# Patient Record
Sex: MALE | Race: WHITE | NOT HISPANIC OR LATINO | Employment: FULL TIME | URBAN - METROPOLITAN AREA
[De-identification: names, ages, dates, MRNs, and addresses within clinical notes are randomized per-mention and may not be internally consistent; named-entity substitution may affect disease eponyms.]

---

## 2017-05-13 LAB
BILIRUB SERPL-MCNC: NEGATIVE MG/DL
BLOOD URINE, POC: NORMAL
CLARITY, POC UA: CLEAR
COLOR, POC UA: YELLOW
GLUCOSE UR QL STRIP: NEGATIVE
KETONES UR QL STRIP: NEGATIVE
LEUKOCYTE EST, POC UA: NEGATIVE
NITRITE, POC UA: NEGATIVE
PH, POC UA: 6
PROTEIN, POC: NEGATIVE
SPECIFIC GRAVITY, POC UA: 1.01
UROBILINOGEN, POC UA: NORMAL

## 2018-08-30 ENCOUNTER — HISTORICAL (OUTPATIENT)
Dept: ADMINISTRATIVE | Facility: HOSPITAL | Age: 43
End: 2018-08-30

## 2018-08-30 LAB
ABS NEUT (OLG): 4.7
ALBUMIN SERPL-MCNC: 4.7 GM/DL (ref 3.4–5)
ALBUMIN/GLOB SERPL: 1.96 {RATIO} (ref 1.5–2.5)
ALP SERPL-CCNC: 52 UNIT/L (ref 38–126)
ALT SERPL-CCNC: 33 UNIT/L (ref 7–52)
APPEARANCE, UA: CLEAR
AST SERPL-CCNC: 25 UNIT/L (ref 15–37)
BACTERIA #/AREA URNS AUTO: NORMAL /HPF
BILIRUB SERPL-MCNC: 0.9 MG/DL (ref 0.2–1)
BILIRUB UR QL STRIP: NEGATIVE MG/DL
BILIRUBIN DIRECT+TOT PNL SERPL-MCNC: 0.2 MG/DL (ref 0–0.5)
BILIRUBIN DIRECT+TOT PNL SERPL-MCNC: 0.7 MG/DL
BUN SERPL-MCNC: 12 MG/DL (ref 7–18)
CALCIUM SERPL-MCNC: 9.4 MG/DL (ref 8.5–10)
CHLORIDE SERPL-SCNC: 101 MMOL/L (ref 98–107)
CHOLEST SERPL-MCNC: 143 MG/DL (ref 0–200)
CHOLEST/HDLC SERPL: 3 {RATIO}
CO2 SERPL-SCNC: 31 MMOL/L (ref 21–32)
COLOR UR: YELLOW
CREAT SERPL-MCNC: 0.76 MG/DL (ref 0.6–1.3)
CREAT UR-MCNC: 200 MG/DL
ERYTHROCYTE [DISTWIDTH] IN BLOOD BY AUTOMATED COUNT: 12.5 % (ref 11.5–17)
EST. AVERAGE GLUCOSE BLD GHB EST-MCNC: 146 MG/DL
GLOBULIN SER-MCNC: 2.4 GM/DL (ref 1.2–3)
GLUCOSE (UA): NEGATIVE MG/DL
GLUCOSE SERPL-MCNC: 108 MG/DL (ref 74–106)
HBA1C MFR BLD: 6.7 % (ref 4.4–6.4)
HCT VFR BLD AUTO: 49 % (ref 42–52)
HDLC SERPL-MCNC: 48 MG/DL (ref 35–60)
HGB BLD-MCNC: 16.3 GM/DL (ref 14–18)
HGB UR QL STRIP: NEGATIVE UNIT/L
KETONES UR QL STRIP: NEGATIVE MG/DL
LDLC SERPL CALC-MCNC: 83 MG/DL (ref 0–129)
LEUKOCYTE ESTERASE UR QL STRIP: NEGATIVE UNIT/L
LYMPHOCYTES # BLD AUTO: 1.9 X10(3)/MCL (ref 0.6–3.4)
LYMPHOCYTES NFR BLD AUTO: 26.1 % (ref 13–40)
MCH RBC QN AUTO: 29.4 PG (ref 27–31.2)
MCHC RBC AUTO-ENTMCNC: 33 GM/DL (ref 32–36)
MCV RBC AUTO: 88 FL (ref 80–94)
MICROALBUMIN UR-MCNC: 10 MG/L
MICROALBUMIN/CREAT RATIO PNL UR: <30 MG/GM
MONOCYTES # BLD AUTO: 0.6 X10(3)/MCL (ref 0–1.8)
MONOCYTES NFR BLD AUTO: 8.1 % (ref 0.1–24)
NEUTROPHILS NFR BLD AUTO: 65.8 % (ref 47–80)
NITRITE UR QL STRIP.AUTO: NEGATIVE
PH UR STRIP: 7.5 [PH]
PLATELET # BLD AUTO: 307 X10(3)/MCL (ref 130–400)
PMV BLD AUTO: 9.6 FL
POTASSIUM SERPL-SCNC: 3.9 MMOL/L (ref 3.5–5.1)
PROT SERPL-MCNC: 7.1 GM/DL (ref 6.4–8.2)
PROT UR QL STRIP: NEGATIVE MG/DL
RBC # BLD AUTO: 5.55 X10(6)/MCL (ref 4.7–6.1)
RBC #/AREA URNS HPF: NORMAL /HPF
SODIUM SERPL-SCNC: 139 MMOL/L (ref 136–145)
SP GR UR STRIP: 1.01
SQUAMOUS EPITHELIAL, UA: NORMAL /LPF
TRIGL SERPL-MCNC: 68 MG/DL (ref 30–150)
UROBILINOGEN UR STRIP-ACNC: 1 MG/DL
VLDLC SERPL CALC-MCNC: 13.6 MG/DL
WBC # SPEC AUTO: 7.2 X10(3)/MCL (ref 4.5–11.5)
WBC #/AREA URNS AUTO: NORMAL /[HPF]

## 2019-09-30 ENCOUNTER — HISTORICAL (OUTPATIENT)
Dept: ADMINISTRATIVE | Facility: HOSPITAL | Age: 44
End: 2019-09-30

## 2019-09-30 LAB
ABS NEUT (OLG): 3.9 X10(3)/MCL (ref 2.1–9.2)
ALBUMIN SERPL-MCNC: 4.6 GM/DL (ref 3.4–5)
ALBUMIN/GLOB SERPL: 2.88 {RATIO} (ref 1.5–2.5)
ALP SERPL-CCNC: 62 UNIT/L (ref 38–126)
ALT SERPL-CCNC: 24 UNIT/L (ref 7–52)
APPEARANCE, UA: CLEAR
AST SERPL-CCNC: 27 UNIT/L (ref 15–37)
BACTERIA #/AREA URNS AUTO: NORMAL /HPF
BILIRUB SERPL-MCNC: 0.6 MG/DL (ref 0.2–1)
BILIRUB UR QL STRIP: NEGATIVE MG/DL
BILIRUBIN DIRECT+TOT PNL SERPL-MCNC: 0.1 MG/DL (ref 0–0.5)
BILIRUBIN DIRECT+TOT PNL SERPL-MCNC: 0.5 MG/DL
BUN SERPL-MCNC: 18 MG/DL (ref 7–18)
CALCIUM SERPL-MCNC: 9.5 MG/DL (ref 8.5–10)
CHLORIDE SERPL-SCNC: 100 MMOL/L (ref 98–107)
CHOLEST SERPL-MCNC: 165 MG/DL (ref 0–200)
CHOLEST/HDLC SERPL: 3.4 {RATIO}
CO2 SERPL-SCNC: 32 MMOL/L (ref 21–32)
COLOR UR: YELLOW
CREAT SERPL-MCNC: 0.93 MG/DL (ref 0.6–1.3)
CREAT UR-MCNC: 100 MG/DL
ERYTHROCYTE [DISTWIDTH] IN BLOOD BY AUTOMATED COUNT: 12.9 % (ref 11.5–17)
EST. AVERAGE GLUCOSE BLD GHB EST-MCNC: 143 MG/DL
GLOBULIN SER-MCNC: 1.6 GM/DL (ref 1.2–3)
GLUCOSE (UA): NEGATIVE MG/DL
GLUCOSE SERPL-MCNC: 200 MG/DL (ref 74–106)
HBA1C MFR BLD: 6.6 % (ref 4.4–6.4)
HCT VFR BLD AUTO: 45.5 % (ref 42–52)
HDLC SERPL-MCNC: 49 MG/DL (ref 35–60)
HGB BLD-MCNC: 15.7 GM/DL (ref 14–18)
HGB UR QL STRIP: NEGATIVE UNIT/L
KETONES UR QL STRIP: NEGATIVE MG/DL
LDLC SERPL CALC-MCNC: 115 MG/DL (ref 0–129)
LEUKOCYTE ESTERASE UR QL STRIP: NEGATIVE UNIT/L
LYMPHOCYTES # BLD AUTO: 1.7 X10(3)/MCL (ref 0.6–3.4)
LYMPHOCYTES NFR BLD AUTO: 27.5 % (ref 13–40)
MCH RBC QN AUTO: 29.6 PG (ref 27–31.2)
MCHC RBC AUTO-ENTMCNC: 34 GM/DL (ref 32–36)
MCV RBC AUTO: 86 FL (ref 80–94)
MICROALBUMIN UR-MCNC: 10 MG/L
MICROALBUMIN/CREAT RATIO PNL UR: <30 MG/GM
MONOCYTES # BLD AUTO: 0.4 X10(3)/MCL (ref 0.1–1.3)
MONOCYTES NFR BLD AUTO: 7.4 % (ref 0.1–24)
NEUTROPHILS NFR BLD AUTO: 65.1 % (ref 47–80)
NITRITE UR QL STRIP.AUTO: NEGATIVE
PH UR STRIP: 7 [PH]
PLATELET # BLD AUTO: 307 X10(3)/MCL (ref 130–400)
PMV BLD AUTO: 9.5 FL (ref 9.4–12.4)
POTASSIUM SERPL-SCNC: 4 MMOL/L (ref 3.5–5.1)
PROT SERPL-MCNC: 6.2 GM/DL (ref 6.4–8.2)
PROT UR QL STRIP: NEGATIVE MG/DL
RBC # BLD AUTO: 5.3 X10(6)/MCL (ref 4.7–6.1)
RBC #/AREA URNS HPF: NORMAL /HPF
SODIUM SERPL-SCNC: 140 MMOL/L (ref 136–145)
SP GR UR STRIP: 1.02
SQUAMOUS EPITHELIAL, UA: NORMAL /LPF
TRIGL SERPL-MCNC: 77 MG/DL (ref 30–150)
UROBILINOGEN UR STRIP-ACNC: 0.2 MG/DL
VLDLC SERPL CALC-MCNC: 15.4 MG/DL
WBC # SPEC AUTO: 6 X10(3)/MCL (ref 4.5–11.5)
WBC #/AREA URNS AUTO: NORMAL /[HPF]

## 2020-09-16 ENCOUNTER — HISTORICAL (OUTPATIENT)
Dept: ADMINISTRATIVE | Facility: HOSPITAL | Age: 45
End: 2020-09-16

## 2020-09-16 LAB
ABS NEUT (OLG): 5.4 X10(3)/MCL (ref 2.1–9.2)
ALBUMIN SERPL-MCNC: 4.6 GM/DL (ref 3.4–5)
ALBUMIN/GLOB SERPL: 2.19 {RATIO} (ref 1.5–2.5)
ALP SERPL-CCNC: 56 UNIT/L (ref 38–126)
ALT SERPL-CCNC: 27 UNIT/L (ref 7–52)
APPEARANCE, UA: CLEAR
AST SERPL-CCNC: 20 UNIT/L (ref 15–37)
BACTERIA #/AREA URNS AUTO: NORMAL /HPF
BILIRUB SERPL-MCNC: 0.7 MG/DL (ref 0.2–1)
BILIRUB UR QL STRIP: NEGATIVE MG/DL
BILIRUBIN DIRECT+TOT PNL SERPL-MCNC: 0.2 MG/DL (ref 0–0.5)
BILIRUBIN DIRECT+TOT PNL SERPL-MCNC: 0.5 MG/DL
BUN SERPL-MCNC: 16 MG/DL (ref 7–18)
CALCIUM SERPL-MCNC: 9.4 MG/DL (ref 8.5–10.1)
CHLORIDE SERPL-SCNC: 102 MMOL/L (ref 98–107)
CHOLEST SERPL-MCNC: 148 MG/DL (ref 0–200)
CHOLEST/HDLC SERPL: 3.1 {RATIO}
CO2 SERPL-SCNC: 31 MMOL/L (ref 21–32)
COLOR UR: YELLOW
CREAT SERPL-MCNC: 0.85 MG/DL (ref 0.6–1.3)
CREAT UR-MCNC: 50 MG/DL
ERYTHROCYTE [DISTWIDTH] IN BLOOD BY AUTOMATED COUNT: 12.5 % (ref 11.5–17)
EST. AVERAGE GLUCOSE BLD GHB EST-MCNC: 146 MG/DL
GLOBULIN SER-MCNC: 2.1 GM/DL (ref 1.2–3)
GLUCOSE (UA): NEGATIVE MG/DL
GLUCOSE SERPL-MCNC: 127 MG/DL (ref 74–106)
HBA1C MFR BLD: 6.7 % (ref 4.4–6.4)
HCT VFR BLD AUTO: 46.3 % (ref 42–52)
HDLC SERPL-MCNC: 47 MG/DL (ref 35–60)
HGB BLD-MCNC: 15.7 GM/DL (ref 14–18)
HGB UR QL STRIP: NEGATIVE UNIT/L
KETONES UR QL STRIP: NEGATIVE MG/DL
LDLC SERPL CALC-MCNC: 97 MG/DL (ref 0–129)
LEUKOCYTE ESTERASE UR QL STRIP: NEGATIVE UNIT/L
LYMPHOCYTES # BLD AUTO: 1.9 X10(3)/MCL (ref 0.6–3.4)
LYMPHOCYTES NFR BLD AUTO: 24.1 % (ref 13–40)
MCH RBC QN AUTO: 29.3 PG (ref 27–31.2)
MCHC RBC AUTO-ENTMCNC: 34 GM/DL (ref 32–36)
MCV RBC AUTO: 86 FL (ref 80–94)
MICROALBUMIN UR-MCNC: 10 MG/L
MICROALBUMIN/CREAT RATIO PNL UR: <30 MG/GM
MONOCYTES # BLD AUTO: 0.7 X10(3)/MCL (ref 0.1–1.3)
MONOCYTES NFR BLD AUTO: 8.7 % (ref 0.1–24)
NEUTROPHILS NFR BLD AUTO: 67.2 % (ref 47–80)
NITRITE UR QL STRIP.AUTO: NEGATIVE
PH UR STRIP: 8 [PH]
PLATELET # BLD AUTO: 303 X10(3)/MCL (ref 130–400)
PMV BLD AUTO: 9.8 FL (ref 9.4–12.4)
POTASSIUM SERPL-SCNC: 4.1 MMOL/L (ref 3.5–5.1)
PROT SERPL-MCNC: 6.7 GM/DL (ref 6.4–8.2)
PROT UR QL STRIP: NEGATIVE MG/DL
RBC # BLD AUTO: 5.35 X10(6)/MCL (ref 4.7–6.1)
RBC #/AREA URNS HPF: NORMAL /HPF
SODIUM SERPL-SCNC: 141 MMOL/L (ref 136–145)
SP GR UR STRIP: 1.02
SQUAMOUS EPITHELIAL, UA: NORMAL /LPF
TRIGL SERPL-MCNC: 90 MG/DL (ref 30–150)
UROBILINOGEN UR STRIP-ACNC: 1 MG/DL
VLDLC SERPL CALC-MCNC: 18 MG/DL
WBC # SPEC AUTO: 8 X10(3)/MCL (ref 4.5–11.5)
WBC #/AREA URNS AUTO: NORMAL /[HPF]

## 2021-09-01 ENCOUNTER — HISTORICAL (OUTPATIENT)
Dept: ADMINISTRATIVE | Facility: HOSPITAL | Age: 46
End: 2021-09-01

## 2021-09-01 LAB
ABS NEUT (OLG): 4.4 X10(3)/MCL (ref 2.1–9.2)
ALBUMIN SERPL-MCNC: 4.6 GM/DL (ref 3.4–5)
ALBUMIN/GLOB SERPL: 2 {RATIO} (ref 1.5–2.5)
ALP SERPL-CCNC: 72 UNIT/L (ref 38–126)
ALT SERPL-CCNC: 29 UNIT/L (ref 7–52)
APPEARANCE, UA: CLEAR
AST SERPL-CCNC: 20 UNIT/L (ref 15–37)
BACTERIA #/AREA URNS AUTO: NORMAL /HPF
BILIRUB SERPL-MCNC: 0.8 MG/DL (ref 0.2–1)
BILIRUB UR QL STRIP: NEGATIVE MG/DL
BILIRUBIN DIRECT+TOT PNL SERPL-MCNC: 0.2 MG/DL (ref 0–0.5)
BILIRUBIN DIRECT+TOT PNL SERPL-MCNC: 0.6 MG/DL
BUN SERPL-MCNC: 15 MG/DL (ref 7–18)
CALCIUM SERPL-MCNC: 9.7 MG/DL (ref 8.5–10.1)
CHLORIDE SERPL-SCNC: 100 MMOL/L (ref 98–107)
CHOLEST SERPL-MCNC: 154 MG/DL (ref 0–200)
CHOLEST/HDLC SERPL: 3.3 {RATIO}
CO2 SERPL-SCNC: 34 MMOL/L (ref 21–32)
COLOR UR: YELLOW
CREAT SERPL-MCNC: 0.91 MG/DL (ref 0.6–1.3)
CREAT UR-MCNC: 100 MG/DL
ERYTHROCYTE [DISTWIDTH] IN BLOOD BY AUTOMATED COUNT: 12.5 % (ref 11.5–17)
EST CREAT CLEARANCE SER (OHS): 111.63 ML/MIN
EST. AVERAGE GLUCOSE BLD GHB EST-MCNC: 180 MG/DL
GLOBULIN SER-MCNC: 2.3 GM/DL (ref 1.2–3)
GLUCOSE (UA): NEGATIVE MG/DL
GLUCOSE SERPL-MCNC: 223 MG/DL (ref 74–106)
HBA1C MFR BLD: 7.9 % (ref 4.4–6.4)
HCT VFR BLD AUTO: 46.2 % (ref 42–52)
HDLC SERPL-MCNC: 47 MG/DL (ref 35–60)
HGB BLD-MCNC: 15.4 GM/DL (ref 14–18)
HGB UR QL STRIP: NEGATIVE UNIT/L
KETONES UR QL STRIP: NEGATIVE MG/DL
LDLC SERPL CALC-MCNC: 90 MG/DL (ref 0–129)
LEUKOCYTE ESTERASE UR QL STRIP: NEGATIVE UNIT/L
LYMPHOCYTES # BLD AUTO: 1.5 X10(3)/MCL (ref 0.6–3.4)
LYMPHOCYTES NFR BLD AUTO: 23.3 % (ref 13–40)
MCH RBC QN AUTO: 29.1 PG (ref 27–31.2)
MCHC RBC AUTO-ENTMCNC: 33 GM/DL (ref 32–36)
MCV RBC AUTO: 87 FL (ref 80–94)
MICROALBUMIN UR-MCNC: 10 MG/L
MICROALBUMIN/CREAT RATIO PNL UR: <30 MG/GM
MONOCYTES # BLD AUTO: 0.5 X10(3)/MCL (ref 0.1–1.3)
MONOCYTES NFR BLD AUTO: 8 % (ref 0.1–24)
NEUTROPHILS NFR BLD AUTO: 68.7 % (ref 47–80)
NITRITE UR QL STRIP.AUTO: NEGATIVE
PH UR STRIP: 7 [PH]
PLATELET # BLD AUTO: 304 X10(3)/MCL (ref 130–400)
PMV BLD AUTO: 10.2 FL (ref 9.4–12.4)
POTASSIUM SERPL-SCNC: 3.8 MMOL/L (ref 3.5–5.1)
PROT SERPL-MCNC: 6.9 GM/DL (ref 6.4–8.2)
PROT UR QL STRIP: NEGATIVE MG/DL
RBC # BLD AUTO: 5.3 X10(6)/MCL (ref 4.7–6.1)
RBC #/AREA URNS HPF: NORMAL /HPF
SODIUM SERPL-SCNC: 141 MMOL/L (ref 136–145)
SP GR UR STRIP: 1.02
SQUAMOUS EPITHELIAL, UA: NORMAL /LPF
TRIGL SERPL-MCNC: 78 MG/DL (ref 30–150)
UROBILINOGEN UR STRIP-ACNC: 1 MG/DL
VLDLC SERPL CALC-MCNC: 15.6 MG/DL
WBC # SPEC AUTO: 6.4 X10(3)/MCL (ref 4.5–11.5)
WBC #/AREA URNS AUTO: NORMAL /[HPF]

## 2022-04-10 ENCOUNTER — HISTORICAL (OUTPATIENT)
Dept: ADMINISTRATIVE | Facility: HOSPITAL | Age: 47
End: 2022-04-10

## 2022-04-29 VITALS
HEIGHT: 66 IN | BODY MASS INDEX: 26.72 KG/M2 | OXYGEN SATURATION: 97 % | SYSTOLIC BLOOD PRESSURE: 118 MMHG | DIASTOLIC BLOOD PRESSURE: 80 MMHG | WEIGHT: 166.25 LBS

## 2022-05-02 NOTE — HISTORICAL [facility]ERNER
This is a historical note converted from Derrick. Formatting and pictures may have been removed.  Please reference Derrick for original formatting and attached multimedia. Chief Complaint  Annual wellness physical- ?NPO  History of Present Illness  Pt presents for Wellness exam.  He has been feeling well.  He is  at [employer].  He has been sleeping well.  Appetite is good.  He has been walking daily and riding his bike.  No tobacco.  He drinks a 6 pack on weekends.  + coffee: 2 cups in am. He may have 1 soft drink a day.   with a?8 year old son; he is in the 3rd grade.  He has been vaccinated for COVID.  [facility]  During COVID,?he did home renovations and added a pool.  Review of Systems  Constitutional:?no?weight gain,?no?weight loss,?no?fatigue, ?no?fever, ?no?chills, ?no?weakness, ?no?trouble sleeping  Eyes: ?no?vision loss/changes,?+?glasses,??no?pain,??no?redness,??no?blurry or double vision,??no?flashing lights,??no?glaucoma,??no?cataracts  Last eye exam:? Anatoly Ernandez [facility], no BDR, 7/30/2021, report in chart  Neck: ?no?lymphadenopathy,??no?thyroid abnormalities,??no?bruits,??no?stiffness  Ears:?no?decreased hearing,??no?tinnitus,??no?earache,??no?drainage?  Nose:?no?congestion,??no?rhinorrhea,??no?epistaxis,??no?sinus pressure  Throat/Oral:?no?sore throat,??no?hoarseness, ?no?dental caries,??no?gum bleeding,??no?oral lesions  Cardiovascular:?no?chest pain, palpitations, or tightness,?no?dyspnea with exertion,??no?orthopnea,??no?paroxysmal nocturnal dyspnea, + hypertension, + hypercholesteremia  Respiratory:?no?cough,?no?sputum,??no?hemoptysis,??no?dyspnea,??no?wheezing,??no?pleuritic chest pain?  Gastrointestinal:?no?abdominal pain,?no?nausea,?no?vomiting,??no?heartburn,??no?dysphagia or odynophagia,??no?diarrhea,??no?constipation,??no?melena,??no?hematochezia,?no?jaundice  Urinary:?no?frequency,??no?urgency,??no?burning or  pain,?no?hematuria,??no?incontinence,??no?hesitancy,??no?incomplete voiding,??no?flank pain,??no?nocturia  Musculoskeletal:?no?myalgias,?no?arthralgias,?no?neck pain,??no?back pain,??no?swelling of extremities  Skin:?no?rashes,?no?sores,?no?non-healing wounds  Neurologic:?no?headaches,?no?dizziness/lightheadedness,?no?tremors,?no?paresthesias,??no?seizures,??no?muscle weakness  Psychiatric:?no?depression/sadness,?no?anhedonia,?no?irritability,?no?suicidal ideations,?no?anxiety,?no?panic attacks  Endocrine:?no?hot or cold intolerance,?no?sweating,?no?polyuria,?no?polydipsia,?no?polyphagia, + diabetes mellitus II  Hematologic:?no?bruising,??no?bleeding disorders?  Physical Exam  Vitals & Measurements  T:?36.8? ?C (Oral)? HR:?85(Peripheral)? BP:?118/80? SpO2:?97%?  HT:?167.00?cm? WT:?75.400?kg? BMI:?27.04?  ?  GENERAL: The patient is a well-developed, well-nourished male in no?apparent distress. He is alert and oriented x 4.  HEENT: Head is normocephalic and atraumatic. Extraocular muscles are intact. Pupils are equal, round, and reactive to light and accommodation. Nares appeared normal. Mouth is well hydrated and without lesions. Mucous membranes are moist. Posterior pharynx clear of any exudate or lesions.  NECK: Supple. No carotid bruits.? No lymphadenopathy or thyromegaly.  LUNGS: Clear to auscultation.  HEART: Regular rate and rhythm without murmur, gallops or rubs.  ABDOMEN: Soft, nontender, and nondistended.? Positive bowel sounds.? No hepatosplenomegaly was noted.  EXTREMITIES: Without any cyanosis, clubbing, rash, lesions or edema. Bilateral feet:?Intact to 10 g monofilament x5; no ulcers, sores or calluses.  NEUROLOGIC: Cranial nerves II through XII are grossly intact.? No motor or sensory deficits.? Cerebellar function intact.  SKIN: No ulceration or induration present.  :? Normal male genitalia, no hernias,?testes descended with normal size and consistency.  ?  Assessment/Plan  1.?Wellness  examination?Z00.00  Patient presents for wellness examination.  He has been in good health.  He is without complaints or concerns.  He has been vaccinated for COVID.  Labs pending.  Ordered:  CBC w/ Auto Diff, Routine collect, 09/01/21 8:52:00 CDT, Blood, Order for future visit, Stop date 09/01/21 8:52:00 CDT, Lab Collect, Wellness examination  HTN (hypertension), 09/01/21 8:52:00 CDT  Clinic Follow-Up Wellness, *Est. 09/01/22 3:00:00 CDT, Order for future visit, Wellness examination, [facility]  Comprehensive Metabolic Panel, Routine collect, 09/01/21 8:52:00 CDT, Blood, Order for future visit, Stop date 09/01/21 8:52:00 CDT, Lab Collect, Wellness examination  HTN (hypertension)  Diabetes mellitus, type 2, 09/01/21 8:52:00 CDT  Lab Collection Request, 09/01/21 8:52:00 CDT, [facility] AMB - [facility], 09/01/21 8:52:00 CDT, Wellness examination  Diabetes mellitus, type 2  HTN (hypertension)  Hyperlipidemia  Lipid Panel, Routine collect, 09/01/21 8:52:00 CDT, Blood, Order for future visit, Stop date 09/01/21 8:52:00 CDT, Lab Collect, Wellness examination  Hyperlipidemia  Diabetes mellitus, type 2  HTN (hypertension), 09/01/21 8:52:00 CDT  Preventative Health Care Est 40-64 years [id number] PC, Wellness examination  Diabetes mellitus, type 2  HTN (hypertension)  Hyperlipidemia, [facility] AMB - [facility], 09/01/21 8:52:00 CDT  Urinalysis no Reflex, Routine collect, Urine, Order for future visit, 09/01/21 8:52:00 CDT, Stop date 09/01/21 8:52:00 CDT, Nurse collect, Wellness examination  Diabetes mellitus, type 2  HTN (hypertension)  ?  2.?Diabetes mellitus, type 2?E11.9  Patient has been compliant with diet and exercise.  A1c, microalbumin and lipid?profile pending.  He is up-to-date with his eye?exams; report in chart.  Foot exam performed today; no evidence of neuropathy.  Ordered:  Comprehensive Metabolic Panel, Routine collect, 09/01/21 8:52:00 CDT, Blood, Order for future visit, Stop date 09/01/21 8:52:00 CDT, Lab Collect,  Wellness examination  HTN (hypertension)  Diabetes mellitus, type 2, 09/01/21 8:52:00 CDT  Hemoglobin A1c, Routine collect, 09/01/21 8:52:00 CDT, Blood, Order for future visit, Stop date 09/01/21 8:52:00 CDT, Lab Collect, Diabetes mellitus, type 2, 09/01/21 8:52:00 CDT  Lab Collection Request, 09/01/21 8:52:00 CDT, [facility] AMB - AFP, 09/01/21 8:52:00 CDT, Wellness examination  Diabetes mellitus, type 2  HTN (hypertension)  Hyperlipidemia  Lipid Panel, Routine collect, 09/01/21 8:52:00 CDT, Blood, Order for future visit, Stop date 09/01/21 8:52:00 CDT, Lab Collect, Wellness examination  Hyperlipidemia  Diabetes mellitus, type 2  HTN (hypertension), 09/01/21 8:52:00 CDT  Microalbumin Urine, Routine collect, Urine, Order for future visit, 09/01/21 8:52:00 CDT, Stop date 09/01/21 8:52:00 CDT, Nurse collect, Diabetes mellitus, type 2  [facility] Health Care Est 40-64 years [id number] PC, Wellness examination  Diabetes mellitus, type 2  HTN (hypertension)  Hyperlipidemia, [facility] AMB - AFP, 09/01/21 8:52:00 CDT  Urinalysis no Reflex, Routine collect, Urine, Order for future visit, 09/01/21 8:52:00 CDT, Stop date 09/01/21 8:52:00 CDT, Nurse collect, Wellness examination  Diabetes mellitus, type 2  HTN (hypertension)  ?  3.?HTN (hypertension)?I10  ?Well-controlled; continue current medication. ?Refills sent.  Ordered:  CBC w/ Auto Diff, Routine collect, 09/01/21 8:52:00 CDT, Blood, Order for future visit, Stop date 09/01/21 8:52:00 CDT, Lab Collect, Wellness examination  HTN (hypertension), 09/01/21 8:52:00 CDT  Comprehensive Metabolic Panel, Routine collect, 09/01/21 8:52:00 CDT, Blood, Order for future visit, Stop date 09/01/21 8:52:00 CDT, Lab Collect, Wellness examination  HTN (hypertension)  Diabetes mellitus, type 2, 09/01/21 8:52:00 CDT  Lab Collection Request, 09/01/21 8:52:00 CDT, [facility] AMB - AFP, 09/01/21 8:52:00 CDT, Wellness examination  Diabetes mellitus, type 2  HTN (hypertension)   Hyperlipidemia  Lipid Panel, Routine collect, 09/01/21 8:52:00 CDT, Blood, Order for future visit, Stop date 09/01/21 8:52:00 CDT, Lab Collect, Wellness examination  Hyperlipidemia  Diabetes mellitus, type 2  HTN (hypertension), 09/01/21 8:52:00 CDT  [facility] Est 40-64 years [id number] PC, Wellness examination  Diabetes mellitus, type 2  HTN (hypertension)  Hyperlipidemia, [facility] AMB - AFP, 09/01/21 8:52:00 CDT  Urinalysis no Reflex, Routine collect, Urine, Order for future visit, 09/01/21 8:52:00 CDT, Stop date 09/01/21 8:52:00 CDT, Nurse collect, Wellness examination  Diabetes mellitus, type 2  HTN (hypertension)  ?  4.?Hyperlipidemia?E78.5  Lipid profile pending.  Continue low-cholesterol diet.  Patient has been compliant with medication; refills sent.  Ordered:  Lab Collection Request, 09/01/21 8:52:00 CDT, [facility] AMB - AFP, 09/01/21 8:52:00 CDT, Wellness examination  Diabetes mellitus, type 2  HTN (hypertension)  Hyperlipidemia  Lipid Panel, Routine collect, 09/01/21 8:52:00 CDT, Blood, Order for future visit, Stop date 09/01/21 8:52:00 CDT, Lab Collect, Wellness examination  Hyperlipidemia  Diabetes mellitus, type 2  HTN (hypertension), 09/01/21 8:52:00 CDT  [facility] Est 40-64 years [id number] PC, Wellness examination  Diabetes mellitus, type 2  HTN (hypertension)  Hyperlipidemia, [facility] AMB - AFP, 09/01/21 8:52:00 CDT  ?  Orders:  glipizide-metformin, See Instructions, TAKE 1 TABLET BY MOUTH TWICE A DAY, # 180 tab(s), 3 Refill(s), Pharmacy: [facility]/pharmacy #[id number], 167, cm, Height/Length Dosing, 09/01/21 8:41:00 CDT, 75.4, kg, Weight Dosing, 09/01/21 8:41:00 CDT  hydrochlorothiazide, See Instructions, TAKE 1 TABLET BY MOUTH EVERY DAY IN THE MORNING, # 90 tab(s), 3 Refill(s), Pharmacy: [facility]/pharmacy #[id number], 167, cm, Height/Length Dosing, 09/01/21 8:41:00 CDT, 75.4, kg, Weight Dosing, 09/01/21 8:41:00 CDT  losartan, See Instructions, TAKE 1 TABLET BY MOUTH DAILY, # 90 tab(s), 3  Refill(s), Pharmacy: [facility]/pharmacy #[id number], 167, cm, Height/Length Dosing, 09/01/21 8:41:00 CDT, 75.4, kg, Weight Dosing, 09/01/21 8:41:00 CDT  simvastatin, See Instructions, TAKE 1 TABLET BY MOUTH AT BEDTIME WITH EVENING MEAL, # 90 tab(s), 3 Refill(s), [id number] 167, cm, Height/Length Dosing, 09/01/21 8:41:00 CDT, 75.4, kg, Weight Dosing, 09/01/21 8:41:00 CDT  valACYclovir, See Instructions, TAKE 2 TABLET BY MOUTH ON ONSET AND REPEAT IN 12 HOURS, # 20 tab(s), 1 Refill(s), [id number], 167, cm, Height/Length Dosing, 09/01/21 8:41:00 CDT, 75.4, kg, Weight Dosing, 09/01/21 8:41:00 CDT  Referrals  Clinic Follow-Up Wellness, *Est. 09/01/22 3:00:00 CDT, Order for future visit, Wellness examination, [facility] [address] [facility]   Problem List/Past Medical History  Ongoing  Diabetes mellitus, type 2  HTN (hypertension)  Hyperlipidemia  Historical  No qualifying data  Procedure/Surgical History  Left 3rd digit fx repair (plate/screws) (03.2019)   Medications  glipizide-metformin 2.5 mg-500 mg oral tablet, See Instructions, 3 refills  hydrochlorothiazide 25 mg oral tablet, See Instructions, 3 refills  losartan 100 mg oral tablet, See Instructions, 3 refills  simvastatin 40 mg oral tablet, See Instructions, 3 refills  valACYclovir 1 g oral tablet, See Instructions, 1 refills  Allergies  No Known Allergies  Social History  Abuse/Neglect  No, 09/30/2019  Alcohol  Current, Beer, 1-2 times per week, 05/26/2018  Employment/School  Employed, Work/School description: SALES., 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 Living situation: Home/Independent., 09/30/2019  Nutrition/Health  Regular, Caffeine intake amount: 2 CUPS OF COFFEE & 1 SODA PER DAY., 08/30/2018  Substance Use  Never, 05/13/2017  Tobacco  Former smoker, quit more than 30 days ago, N/A, 27 Years (Age stopped)., 09/30/2019  Family History  Heart disease.: Mother.  Hyperlipidemia.:  Sister.  Primary malignant neoplasm of prostate: Father.  Immunizations  Vaccine Date Status   COVID-19 MRNA, LNP-S, PF- Pfizer 04/04/2021 Recorded   COVID-19 MRNA, LNP-S, PF- Pfizer 03/13/2021 Recorded   tetanus/diphtheria/pertussis, acel(Tdap) 09/16/2020 Given   pneumococcal 23-polyvalent vaccine 09/16/2020 Given   influenza virus vaccine, inactivated 09/30/2019 Given   influenza virus vaccine, inactivated 08/30/2018 Given   influenza virus vaccine, inactivated 10/13/2017 Recorded   influenza virus vaccine, inactivated 10/18/2016 Recorded   influenza virus vaccine, inactivated 11/11/2015 Recorded   influenza virus vaccine, inactivated 11/12/2014 Recorded   pneumococcal 23-polyvalent vaccine 06/17/2014 Recorded   tetanus/diphtheria/pertussis, acel(Tdap) 12/27/2011 Recorded   influenza virus vaccine, inactivated 10/06/2011 Recorded   Health Maintenance  Health Maintenance  ???Pending?(in the next year)  ??? ??OverDue  ??? ? ? ?Alcohol Misuse Screening due??01/02/21??and every 1??year(s)  ??? ??Due?  ??? ? ? ?ADL Screening due??09/01/21??and every 1??year(s)  ??? ? ? ?Depression Screening due??09/01/21??Unknown Frequency  ??? ? ? ?Diabetes Maintenance-Medication Prescribed due??09/01/21??and every 1??year(s)  ??? ??Due In Future?  ??? ? ? ?Diabetes Maintenance-Foot Exam not due until??09/16/21??and every 1??year(s)  ??? ? ? ?Diabetes Maintenance-HgbA1c not due until??09/16/21??and every 1??year(s)  ??? ? ? ?Hypertension Management-BMP not due until??09/16/21??and every 1??year(s)  ??? ? ? ?Diabetes Maintenance-Fasting Lipid Profile not due until??09/16/21??and every 1??year(s)  ??? ? ? ?Diabetes Maintenance-Serum Creatinine not due until??09/16/21??and every 1??year(s)  ??? ? ? ?Obesity Screening not due until??01/01/22??and every 1??year(s)  ??? ? ? ?Diabetes Maintenance-Eye Exam not due until??07/30/22??and every 1??year(s)  ???Satisfied?(in the past 1 year)  ??? ??Satisfied?  ??? ? ? ?Blood Pressure Screening  on??09/01/21.??Satisfied by Herlinda Kearns LPN  ??? ? ? ?Body Mass Index Check on??09/01/21.??Satisfied by Herlinda Kearns LPN  ??? ? ? ?Diabetes Maintenance-Eye Exam on??07/30/21.??Satisfied by Belinda Thurman  ??? ? ? ?Diabetes Maintenance-Fasting Lipid Profile on??09/16/20.??Satisfied by Jesu Jeffries  ??? ? ? ?Diabetes Maintenance-Foot Exam on??09/16/20.??Satisfied by Mariana Mackenzie NP  ??? ? ? ?Diabetes Maintenance-HgbA1c on??09/16/20.??Satisfied by Jesu Jeffries  ??? ? ? ?Diabetes Maintenance-Microalbumin on??09/16/20.??Satisfied by Ryann Whiting  ??? ? ? ?Diabetes Maintenance-Serum Creatinine on??09/16/20.??Satisfied by Jesu Jeffries  ??? ? ? ?Diabetes Screening on??09/16/20.??Satisfied by Jesu Jeffries  ??? ? ? ?Hypertension Management-Blood Pressure on??09/01/21.??Satisfied by Herlinda Kearns LPN  ??? ? ? ?Hypertension Management-BMP on??09/16/20.??Satisfied by Ryann Whiting  ??? ? ? ?Lipid Screening on??09/16/20.??Satisfied by Jesu Jeffries  ??? ? ? ?Obesity Screening on??09/01/21.??Satisfied by Herlinda Kearns LPN  ??? ? ? ?Tetanus Vaccine on??09/16/20.??Satisfied by Hamilton Crawford LPN  ?

## 2022-05-02 NOTE — HISTORICAL [facility]
This is a historical note converted from Derrick. Formatting and pictures may have been removed.  Please reference Derrick for original formatting and attached multimedia. Chief Complaint  ANNUAL WELLNESS PHYSICAL- NPO  History of Present Illness  Pt presents for Wellness exam.  He has been feeling well.  He is  at [employer].  He has been sleeping well.  Appetite is good; he has been more compliant with diet. He states he could be doing better.  He has been walking daily.  No tobacco.  He drinks a 6 pack on weekends.  + coffee: 2 cups in am.   with a?6 year old son who?was diagnosed with peanut and shellfish allergies last year.  He had surgery on his left third finger for?a fracture in March.  Review of Systems  Constitutional:?no?weight gain,?no?weight loss,?no?fatigue, ?no?fever, ?no?chills, ?no?weakness, ?no?trouble sleeping  Eyes: ?no?vision loss/changes,?+?glasses,??no?pain,??no?redness,??no?blurry or double vision,??no?flashing lights,??no?glaucoma,??no?cataracts  Last eye exam:?has been a year  Neck: ?no?lymphadenopathy,??no?thyroid abnormalities,??no?bruits,??no?stiffness  Ears:?no?decreased hearing,??no?tinnitus,??no?earache,??no?drainage?  Nose:?no?congestion,??no?rhinorrhea,??no?epistaxis,??no?sinus pressure  Throat/Oral:?no?sore throat,??no?hoarseness, ?no?dental caries,??no?gum bleeding,??no?oral lesions  Cardiovascular:?no?chest pain, palpitations, or tightness,?no?dyspnea with exertion,??no?orthopnea,??no?paroxysmal nocturnal dyspnea  Respiratory:?no?cough,?no?sputum,??no?hemoptysis,??no?dyspnea,??no?wheezing,??no?pleuritic chest pain?  Gastrointestinal:?no?abdominal pain,?no?nausea,?no?vomiting,??no?heartburn,??no?dysphagia or odynophagia,??no?diarrhea,??no?constipation,??no?melena,??no?hematochezia,?no?jaundice  Urinary:?no?frequency,??no?urgency,??no?burning or pain,?no?hematuria,??no?incontinence,??no?hesitancy,??no?incomplete voiding,??no?flank  pain,??no?nocturia  Musculoskeletal:?no?myalgias,?no?arthralgias,?no?neck pain,??no?back pain,??no?swelling of extremities  Skin:?no?rashes,?no?sores,?no?non-healing wounds  Neurologic:?no?headaches,?no?dizziness/lightheadedness,?no?tremors,?no?paresthesias,??no?seizures,??no?muscle weakness  Psychiatric:?no?depression/sadness,?no?anhedonia,?no?irritability,?no?suicidal ideations,?no?anxiety,?no?panic attacks  Endocrine:?no?hot or cold intolerance,?no?sweating,?no?polyuria,?no?polydipsia,?no?polyphagia  Hematologic:?no?bruising,??no?bleeding disorders?  Physical Exam  Vitals & Measurements  HR:?84(Peripheral)? BP:?120/80?  HT:?167?cm? WT:?73.9?kg? BMI:?26.5?  ?  GENERAL: The patient is a well-developed, well-nourished male in no?apparent distress. He is alert and oriented x 4.  HEENT: Head is normocephalic and atraumatic. Extraocular muscles are intact. Pupils are equal, round, and reactive to light and accommodation. Nares appeared normal. Mouth is well hydrated and without lesions. Mucous membranes are moist. Posterior pharynx clear of any exudate or lesions.  NECK: Supple. No carotid bruits.? No lymphadenopathy or thyromegaly.  LUNGS: Clear to auscultation.  HEART: Regular rate and rhythm without murmurs, rubs or gallops.  ABDOMEN: Soft, nontender, and nondistended.? Positive bowel sounds.? No hepatosplenomegaly was noted.  EXTREMITIES: Without any cyanosis, clubbing, rash, lesions or edema.? Bilateral feet: Intact to 10 g monofilament?x 5. ?No sores, ulcers or calluses.  NEUROLOGIC: Cranial nerves II through XII are grossly intact.? No motor or sensory deficits.? Cerebellar function intact.  SKIN: No ulceration or induration present.  :? Normal male genitalia, no hernias,?testes descended with normal size and consistency.  ?  Assessment/Plan  1.?Wellness examination?Z00.00  He has?been doing well. ?He did suffer?a left?third digit fracture?which she had repaired?by Dr. Micah Renee.  He stays physically  active and tries to be adherent to his diet.  Quadrivalent influenza vaccine administered today.  ?  Ordered:  CBC w/ Auto Diff, Routine collect, 09/30/19 8:21:00 CDT, Blood, Order for future visit, Stop date 09/30/19 8:21:00 CDT, Lab Collect, Wellness examination  HTN (hypertension), 09/30/19 8:21:00 CDT  Clinic Follow-Up Wellness, *Est. 09/30/20 3:00:00 CDT, Order for future visit, Wellness examination, [facility] AFP  Comprehensive Metabolic Panel, Routine collect, 09/30/19 8:21:00 CDT, Blood, Order for future visit, Stop date 09/30/19 8:21:00 CDT, Lab Collect, Wellness examination  Diabetes  HTN (hypertension)  Hyperlipidemia, 09/30/19 8:21:00 CDT  Lipid Panel, Routine collect, 09/30/19 8:21:00 CDT, Blood, Order for future visit, Stop date 09/30/19 8:21:00 CDT, Lab Collect, Wellness examination  Hyperlipidemia  Diabetes  HTN (hypertension), 09/30/19 8:21:00 CDT  Preventative Health Care Est 40-64 years [id number] PC, Wellness examination  Diabetes  HTN (hypertension)  Hyperlipidemia, [facility] AMB - AFP, 09/30/19 8:22:00 CDT  Urinalysis Complete no reflex, Routine collect, Urine, Order for future visit, 09/30/19 8:22:00 CDT, Stop date 09/30/19 8:22:00 CDT, Nurse collect, Wellness examination  Diabetes  HTN (hypertension)  ?  2.?Diabetes?E11.9  We will check A1c today.  Patient advised to have his eyes checked.  Feet exam done today.  Microalbumin pending.  Ordered:  Comprehensive Metabolic Panel, Routine collect, 09/30/19 8:21:00 CDT, Blood, Order for future visit, Stop date 09/30/19 8:21:00 CDT, Lab Collect, Wellness examination  Diabetes  HTN (hypertension)  Hyperlipidemia, 09/30/19 8:21:00 CDT  Hemoglobin A1c, Routine collect, 09/30/19 8:21:00 CDT, Blood, Order for future visit, Stop date 09/30/19 8:21:00 CDT, Lab Collect, Diabetes, 09/30/19 8:21:00 CDT  Lipid Panel, Routine collect, 09/30/19 8:21:00 CDT, Blood, Order for future visit, Stop date 09/30/19 8:21:00 CDT, Lab Collect, Wellness examination   Hyperlipidemia  Diabetes  HTN (hypertension), 09/30/19 8:21:00 CDT  Microalbumin Urine, Routine collect, Urine, Order for future visit, 09/30/19 8:21:00 CDT, Stop date 09/30/19 8:21:00 CDT, Nurse collect, Diabetes  Preventative [facility] Est 40-64 years [id number] PC, Wellness examination  Diabetes  HTN (hypertension)  Hyperlipidemia, [facility] AMB - AFP, 09/30/19 8:22:00 CDT  Urinalysis Complete no reflex, Routine collect, Urine, Order for future visit, 09/30/19 8:22:00 CDT, Stop date 09/30/19 8:22:00 CDT, Nurse collect, Wellness examination  Diabetes  HTN (hypertension)  ?  3.?HTN (hypertension)?I10  ?Well-controlled; continue current medications.  Ordered:  CBC w/ Auto Diff, Routine collect, 09/30/19 8:21:00 CDT, Blood, Order for future visit, Stop date 09/30/19 8:21:00 CDT, Lab Collect, Wellness examination  HTN (hypertension), 09/30/19 8:21:00 CDT  Comprehensive Metabolic Panel, Routine collect, 09/30/19 8:21:00 CDT, Blood, Order for future visit, Stop date 09/30/19 8:21:00 CDT, Lab Collect, Wellness examination  Diabetes  HTN (hypertension)  Hyperlipidemia, 09/30/19 8:21:00 CDT  Lipid Panel, Routine collect, 09/30/19 8:21:00 CDT, Blood, Order for future visit, Stop date 09/30/19 8:21:00 CDT, Lab Collect, Wellness examination  Hyperlipidemia  Diabetes  HTN (hypertension), 09/30/19 8:21:00 CDT  [facility] 40-64 years [id number] PC, Wellness examination  Diabetes  HTN (hypertension)  Hyperlipidemia, [facility] AMB - AFP, 09/30/19 8:22:00 CDT  Urinalysis Complete no reflex, Routine collect, Urine, Order for future visit, 09/30/19 8:22:00 CDT, Stop date 09/30/19 8:22:00 CDT, Nurse collect, Wellness examination  Diabetes  HTN (hypertension)  ?  4.?Hyperlipidemia?E78.5  ?Check today.  Ordered:  Comprehensive Metabolic Panel, Routine collect, 09/30/19 8:21:00 CDT, Blood, Order for future visit, Stop date 09/30/19 8:21:00 CDT, Lab Collect, Wellness examination  Diabetes  HTN (hypertension)   Hyperlipidemia, 09/30/19 8:21:00 CDT  Lipid Panel, Routine collect, 09/30/19 8:21:00 CDT, Blood, Order for future visit, Stop date 09/30/19 8:21:00 CDT, Lab Collect, Wellness examination  Hyperlipidemia  Diabetes  HTN (hypertension), 09/30/19 8:21:00 CDT  Preventative Health Care Est 40-64 years [id number] PC, Wellness examination  Diabetes  HTN (hypertension)  Hyperlipidemia, INK AMB - AFP, 09/30/19 8:22:00 CDT  ?  Orders:  glipizide-metformin, 1 tab(s), Oral, BID, # 180 tab(s), 3 Refill(s), Pharmacy: [facility]/pharmacy #[id number]  hydrochlorothiazide, 25 mg = 1 tab(s), Oral, qAM, # 90 tab(s), 3 Refill(s), Pharmacy: [facility]/pharmacy #[id number]  Influenza Virus Vaccine, Inactivated, 0.5 mL, IM, Once, first dose 09/30/19 8:20:00 CDT, stop date 09/30/19 8:20:00 CDT  losartan, See Instructions, TAKE 1 TABLET BY MOUTH DAILY, # 90 tab(s), 3 Refill(s), Pharmacy: [facility]/pharmacy #[id number]  simvastatin, See Instructions, TAKE 1 TABLET BY MOUTH AT BEDTIME WITH EVENING MEAL, # 90 tab(s), 3 Refill(s), Pharmacy: [facility]/pharmacy #[id number]  Lab Collection Request, 09/30/19 8:21:00 CDT, [facility] AMB - AFP, 09/30/19 8:21:00 CDT  Referrals  Clinic Follow-Up Wellness, *Est. 09/30/20 3:00:00 CDT, Order for future visit, Wellness examination, [facility]   Problem List/Past Medical History  Ongoing  Diabetes  HTN (hypertension)  Hyperlipidemia  Historical  No qualifying data  Procedure/Surgical History  none   Medications  glipizide-metformin 2.5 mg-500 mg oral tablet, 1 tab(s), Oral, BID, 3 refills  hydrochlorothiazide 25 mg oral tablet, 25 mg= 1 tab(s), Oral, qAM, 3 refills  losartan 100 mg oral tablet, See Instructions, 3 refills  simvastatin 40 mg oral tablet, See Instructions, 3 refills  valACYclovir 1 g oral tablet, See Instructions, 1 refills  Allergies  No Known Allergies  Social History  Abuse/Neglect  No, 09/30/2019  Alcohol  Current, Beer, 1-2 times per week, 05/26/2018  Employment/School  Employed, Work/School description: SALES.,  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 Living situation: Home/Independent., 09/30/2019  Nutrition/Health  Regular, Caffeine intake amount: 2 CUPS OF COFFEE & 1 SODA PER DAY., 08/30/2018  Substance Use  Never, 05/13/2017  Tobacco  Former smoker, quit more than 30 days ago, N/A, 27 Years (Age stopped)., 09/30/2019  Former smoker Use:. Cigarettes Type:. Stopped age 27 Years., 08/30/2018  Family History  Heart disease.: Mother.  Hyperlipidemia.: Sister.  Primary malignant neoplasm of prostate: Father.  Immunizations  Vaccine Date Status   influenza virus vaccine, inactivated 09/30/2019 Given   influenza virus vaccine, inactivated 08/30/2018 Given   influenza virus vaccine, inactivated 10/13/2017 Recorded   influenza virus vaccine, inactivated 10/18/2016 Recorded   influenza virus vaccine, inactivated 11/11/2015 Recorded   influenza virus vaccine, inactivated 11/12/2014 Recorded   pneumococcal 23-polyvalent vaccine 06/17/2014 Recorded   tetanus/diphtheria/pertussis, acel(Tdap) 12/27/2011 Recorded   influenza virus vaccine, inactivated 10/06/2011 Recorded   Health Maintenance  Health Maintenance  ???Pending?(in the next year)  ??? ??OverDue  ??? ? ? ?Influenza Vaccine due??and every?  ??? ? ? ?Alcohol Misuse Screening due??01/01/19??and every 1??year(s)  ??? ? ? ?Diabetes Maintenance-Fasting Lipid Profile due??08/30/19??and every 1??year(s)  ??? ? ? ?Diabetes Maintenance-HgbA1c due??08/30/19??and every 1??year(s)  ??? ? ? ?Hypertension Management-BMP due??08/30/19??and every 1??year(s)  ??? ??Due?  ??? ? ? ?ADL Screening due??09/30/19??and every 1??year(s)  ??? ? ? ?Depression Screening due??09/30/19??and every?  ??? ? ? ?Diabetes Maintenance-Eye Exam due??09/30/19??and every?  ??? ? ? ?Diabetes Maintenance-Foot Exam due??09/30/19??and every?  ??? ??Due In Future?  ??? ? ? ?Obesity Screening not due until??01/01/20??and every 1??year(s)  ??? ?  ? ?Blood Pressure Screening not due until??09/29/20??and every 1??year(s)  ??? ? ? ?Body Mass Index Check not due until??09/29/20??and every 1??year(s)  ??? ? ? ?Hypertension Management-Blood Pressure not due until??09/29/20??and every 1??year(s)  ???Satisfied?(in the past 1 year)  ??? ??Satisfied?  ??? ? ? ?Blood Pressure Screening on??09/30/19.??Satisfied by Herlinda Kearns LPN  ??? ? ? ?Body Mass Index Check on??09/30/19.??Satisfied by Herlinda Kearns LPN  ??? ? ? ?Hypertension Management-BMP on??09/30/19.??Satisfied by Jesu Jeffries  ??? ? ? ?Influenza Vaccine on??09/30/19.??Satisfied by Herlinda Kearns LPN  ??? ? ? ?Obesity Screening on??09/30/19.??Satisfied by Herlinda Kearns LPN  ?

## 2022-05-02 NOTE — HISTORICAL [facility]
This is a historical note converted from Derrick. Formatting and pictures may have been removed.  Please reference Derrick for original formatting and attached multimedia. Chief Complaint  ANNUAL WELLNESS PHYSICAL--- NPO  History of Present Illness  Pt presents for Wellness exam.  He has been feeling well.  He is starting a new position at [employer] on Monday; [employer].  He has been sleeping well.  Appetite is good; he has been more compliant with diet. He does report elevated blood sugars in am: 160s-180s.  He has been walking daily.  No tobacco.  He drinks a 6 pack over weekend.  + coffee: 2 cups in am.  His 5 year old son was diagnosed with peanut and shellfish allergies.  Review of Systems  Constitutional:?no?weight gain,?no?weight loss,?no?fatigue, ?no?fever, ?no?chills, ?no?weakness, ?no?trouble sleeping  Eyes: ?no?vision loss/changes,?+?glasses,??no?pain,??no?redness,??no?blurry or double vision,??no?flashing lights,??no?glaucoma,??no?cataracts  Last eye exam:?has an apt tomorrow  Neck: ?no?lymphadenopathy,??no?thyroid abnormalities,??no?bruits,??no?stiffness  Ears:?no?decreased hearing,??no?tinnitus,??no?earache,??no?drainage?  Nose:?no?congestion,??no?rhinorrhea,??no?epistaxis,??no?sinus pressure  Throat/Oral:?no?sore throat,??no?hoarseness, ?no?dental caries,??no?gum bleeding,??no?oral lesions  Cardiovascular:?no?chest pain, palpitations, or tightness,?no?dyspnea with exertion,??no?orthopnea,??no?paroxysmal nocturnal dyspnea  Respiratory:?no?cough,?no?sputum,??no?hemoptysis,??no?dyspnea,??no?wheezing,??no?pleuritic chest pain?  Gastrointestinal:?no?abd pain,?no?nausea,?no?vomiting,??no?heartburn,??no?dysphagia or odynophagia,??no?diarrhea,??no?constipation,??no?melena,??no?hematochezia,?no?jaundice  Urinary:?no?frequency,??no?urgency,??no?burning or pain,?no?hematuria,??no?incontinence,??no?hesitancy,??no?incomplete voiding,??no?flank pain,??no?nocturia, no erectile  dysfunction  Musculoskeletal:?no?myalgias,?no?arthralgias,?no?neck pain,??no?back pain,??no?swelling of extremities  Skin:?no?rashes,?no?sores,?no?non-healing wounds  Neurologic:?no?headaches,?no?dizziness/lightheadedness,?no?tremors,?no?paresthesias,??no?seizures,??no?muscle weakness  Psychiatric:?no?depression/sadness,?no?anhedonia,?no?irritability,?no?suicidal ideations,?no?anxiety,?no?panic attacks  Endocrine:?no?hot or cold intolerance,?no?sweating,?no?polyuria,?no?polydipsia,?no?polyphagia  Hematologic:?no?bruising,??no?bleeding disorders?  Physical Exam  Vitals & Measurements  HR:?92(Peripheral)? BP:?90/50?  HT:?167?cm? HT:?167?cm? WT:?75.4?kg? WT:?75.4?kg? BMI:?27.04?  ?  GENERAL: The patient is a well-developed, well-nourished male in no?apparent distress. He is alert and oriented x 4.  HEENT: Head is normocephalic and atraumatic. Extraocular muscles are intact. Pupils are equal, round, and reactive to light and accommodation. Nares appeared normal. Mouth is well hydrated and without lesions. Mucous membranes are moist. Posterior pharynx clear of any exudate or lesions.  NECK: Supple. No carotid bruits.? No lymphadenopathy or thyromegaly.  LUNGS: Clear to auscultation.  HEART: Regular rate and rhythm without murmurs, rubs or gallops.  ABDOMEN: Soft, nontender, and nondistended.? Positive bowel sounds.? No hepatosplenomegaly was noted.  EXTREMITIES: Without any cyanosis, clubbing, rash, lesions or edema.? Feet: intact to 10 gm monofilament x 5 bilaterally; no sores, lesions or callouses.  NEUROLOGIC: Cranial nerves II through XII are grossly intact.? No motor or sensory deficits.? Cerebellar function intact.  SKIN: No ulceration or induration present.  :? Nl male genitalia, no hernias,? testes descended, normal size/consistency.  ?  Assessment/Plan  1.?Wellness examination  ?Pt has been doing well. He has started walking recently.  Ordered:  Influenza Virus Vaccine, Inactivated, 0.5 mL, IM, Once, first  dose 08/30/18 11:24:00 CDT, stop date 08/30/18 11:24:00 CDT  Automated Diff, Routine collect, 08/30/18 11:22:00 CDT, Blood, Collected, Stop date 08/30/18 11:22:00 CDT, Lab Collect, Wellness examination, 08/30/18 11:22:00 CDT  CBC w/ Auto Diff, Routine collect, 08/30/18 11:22:00 CDT, Blood, Stop date 08/30/18 11:22:00 CDT, Lab Collect, Wellness examination, 08/30/18 11:22:00 CDT  Clinic Follow up, *Est. 02/28/19 3:00:00 CST, Order for future visit, Wellness examination, ink AFP  Comprehensive Metabolic Panel, Routine collect, 08/30/18 11:22:00 CDT, Blood, Stop date 08/30/18 11:22:00 CDT, Lab Collect, Wellness examination  Diabetes  HTN (hypertension)  Hyperlipidemia, 08/30/18 11:22:00 CDT  Lipid Panel, Routine collect, 08/30/18 11:22:00 CDT, Blood, Stop date 08/30/18 11:22:00 CDT, Lab Collect, Hyperlipidemia  Diabetes  HTN (hypertension)  Wellness examination, 08/30/18 11:22:00 CDT  Preventative Health Care Est 40-64 years [id number] PC, Wellness examination, [facility] AMB - AFP, 08/30/18 11:17:00 CDT  Urinalysis Complete no reflex, Routine collect, Urine, 08/30/18 11:22:00 CDT, Stop date 08/30/18 11:22:00 CDT, Nurse collect, Wellness examination  ?  2.?Diabetes  ?He reports elevated am readings. Check A1C today.  Ordered:  Comprehensive Metabolic Panel, Routine collect, 08/30/18 11:22:00 CDT, Blood, Stop date 08/30/18 11:22:00 CDT, Lab Collect, Wellness examination  Diabetes  HTN (hypertension)  Hyperlipidemia, 08/30/18 11:22:00 CDT  Hemoglobin A1c, Routine collect, 08/30/18 11:22:00 CDT, Blood, Stop date 08/30/18 11:22:00 CDT, Lab Collect, Diabetes, 08/30/18 11:22:00 CDT  Lipid Panel, Routine collect, 08/30/18 11:22:00 CDT, Blood, Stop date 08/30/18 11:22:00 CDT, Lab Collect, Hyperlipidemia  Diabetes  HTN (hypertension)  Wellness examination, 08/30/18 11:22:00 CDT  Microalbumin Urine, Routine collect, Urine, 08/30/18 11:22:00 CDT, Stop date 08/30/18 11:22:00 CDT, Nurse collect, Diabetes  ?  3.?HTN  (hypertension)  ?Controlled; his reading is actually low today. Pt is asymptomatic.  Ordered:  Comprehensive Metabolic Panel, Routine collect, 08/30/18 11:22:00 CDT, Blood, Stop date 08/30/18 11:22:00 CDT, Lab Collect, Wellness examination  Diabetes  HTN (hypertension)  Hyperlipidemia, 08/30/18 11:22:00 CDT  Lipid Panel, Routine collect, 08/30/18 11:22:00 CDT, Blood, Stop date 08/30/18 11:22:00 CDT, Lab Collect, Hyperlipidemia  Diabetes  HTN (hypertension)  Wellness examination, 08/30/18 11:22:00 CDT  ?  4.?Hyperlipidemia  ? Check today.  Ordered:  Comprehensive Metabolic Panel, Routine collect, 08/30/18 11:22:00 CDT, Blood, Stop date 08/30/18 11:22:00 CDT, Lab Collect, Wellness examination  Diabetes  HTN (hypertension)  Hyperlipidemia, 08/30/18 11:22:00 CDT  Lipid Panel, Routine collect, 08/30/18 11:22:00 CDT, Blood, Stop date 08/30/18 11:22:00 CDT, Lab Collect, Hyperlipidemia  Diabetes  HTN (hypertension)  Wellness examination, 08/30/18 11:22:00 CDT  ?  Orders:  glipizide-metformin, 1 tab(s), Oral, BID, # 180 tab(s), 3 Refill(s), Pharmacy: [facility]/pharmacy #[id number]  hydrochlorothiazide, 25 mg = 1 tab(s), Oral, qAM, # 90 tab(s), 3 Refill(s), Pharmacy: [facility]/pharmacy #[id number]  losartan, See Instructions, TAKE 1 TABLET BY MOUTH DAILY, # 90 tab(s), 3 Refill(s), Pharmacy: [facility]/pharmacy #[id number]  simvastatin, See Instructions, TAKE 1 TABLET BY MOUTH AT BEDTIME WITH EVENING MEAL, # 90 tab(s), 3 Refill(s), Pharmacy: [facility]/pharmacy #[id number]   Problem List/Past Medical History  Ongoing  Diabetes  HTN (hypertension)  Hyperlipidemia  Historical  No qualifying data  Procedure/Surgical History  none   Medications  glipizide-metformin 2.5 mg-500 mg oral tablet, 1 tab(s), Oral, BID, 3 refills  hydrochlorothiazide 25 mg oral tablet, 25 mg= 1 tab(s), Oral, qAM, 3 refills  Influenza Virus Vaccine, Inactivated, 0.5 mL, IM, Once  losartan 100 mg oral tablet, See Instructions, 3 refills  simvastatin 40 mg oral tablet, See Instructions, 3  refills  valACYclovir 1 g oral tablet, See Instructions, 1 refills  Allergies  No Known Allergies  Social History  Alcohol  Current, Beer, 1-2 times per week, 05/26/2018  Never, 05/13/2017  Employment/School  Employed, Work/School description: SALES., 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 Living situation: Home/Independent., 08/30/2018  Nutrition/Health  Regular, Caffeine intake amount: 2 CUPS OF COFFEE & 1 SODA PER DAY., 08/30/2018  Substance Abuse  Never, 05/13/2017  Tobacco  Former smoker Use:. Cigarettes Type:. Stopped age 27 Years., 08/30/2018  Family History  Heart disease.: Mother.  Hyperlipidemia.: Sister.  Primary malignant neoplasm of prostate: Father.  Immunizations  Vaccine Date Status   influenza virus vaccine, inactivated 10/13/2017 Recorded   influenza virus vaccine, inactivated 10/18/2016 Recorded   influenza virus vaccine, inactivated 11/11/2015 Recorded   influenza virus vaccine, inactivated 11/12/2014 Recorded   pneumococcal 23-polyvalent vaccine 06/17/2014 Recorded   tetanus/diphtheria/pertussis, acel(Tdap) 12/27/2011 Recorded   influenza virus vaccine, inactivated 10/06/2011 Recorded   Health Maintenance  Health Maintenance  ???Pending?(in the next year)  ??? ??OverDue  ??? ? ? ?Influenza Vaccine due??and every?  ??? ??Due?  ??? ? ? ?Alcohol Misuse Screening due??08/30/18??and every 1??year(s)  ??? ? ? ?Depression Screening due??08/30/18??and every?  ??? ? ? ?Diabetes Maintenance-Eye Exam due??08/30/18??and every?  ??? ? ? ?Diabetes Maintenance-Fasting Lipid Profile due??08/30/18??and every?  ??? ? ? ?Diabetes Maintenance-Foot Exam due??08/30/18??and every?  ??? ? ? ?Diabetes Maintenance-HgbA1c due??08/30/18??and every?  ??? ? ? ?Hypertension Management-BMP due??08/30/18??and every?  ???Satisfied?(in the past 1 year)  ??? ??Satisfied?  ??? ? ? ?Blood Pressure Screening on??08/30/18.??Satisfied by Herlinda Kearns  ??? ? ? ?Body  Mass Index Check on??08/30/18.??Satisfied by Herlinda Kearns  ??? ? ? ?Diabetes Maintenance-HgbA1c on??08/30/18.??Satisfied by Ming Robles MD  ??? ? ? ?Diabetes Screening on??08/30/18.??Satisfied by Ming Robles MD  ??? ? ? ?Hypertension Management-Blood Pressure on??08/30/18.??Satisfied by Herlinda Kearns  ??? ? ? ?Influenza Vaccine on??08/30/18.??Satisfied by Ming Robles MD  ??? ? ? ?Lipid Screening on??08/30/18.??Satisfied by Ming Robles MD  ??? ? ? ?Obesity Screening on??08/30/18.??Satisfied by Herlinda Kearns  ?  ?

## 2022-05-02 NOTE — HISTORICAL [facility]ERNER
This is a historical note converted from [place]. Formatting and pictures may have been removed.  Please reference [place] for original formatting and attached multimedia. Chief Complaint  Annual wellness physical- NPO  History of Present Illness  Pt presents for Wellness exam.  He has been feeling well.  He is  at [employer].  He has been sleeping well.  Appetite is good.  He has been walking daily and riding his bike.  He checks his CBGs few times yearly. He follows a low carb/ADA?diet.  No tobacco.  He drinks a 6 pack on weekends.  +coffee: 2 cups in am.   with a?7 year old son.  Review of Systems  GENERAL: No weight loss, no?weight gain, no fever, no fatigue, no chills, no night sweats  HEENT: No sore throat, no ear pain, no sinus pressure, no nasal congestion, no rhinorrhea, no decreased hearing  VISION: No vision changes, no blurry vision, no double vision, no glaucoma, no cataracts, +glasses, no contacts  LAST EYE EXAM: Feb 2020  NECK: no LAD  CARDIAC: No chest pain, no palpitations, no dyspnea on exertion, no orthopnea  RESPIRATORY: No cough, no wheezing, no sputum production, no?SOB  GI: No abdominal pain, no N/V, no constipation, no diarrhea, no blood in stool,?(-)?family history of Colon Ca  : No dysuria, no hematuria, no frequency, no urgency, no incontinence, no testicular pain/swelling, (+) family history of Prostate Ca--father  MUSC/SKEL: No myalgia, no weakness, no edema, no arthralgia, no joint swelling/effusions  SKIN: No rashes, no hives, no itching, no sores  NEURO: No HA, no numbness, no tingling, no weakness, no dizziness  PSYCH: No anxiety, no depression, no?irritability, no panic attacks,?no s/i, no h/i, no?hallucinations  ENDO: No polyuria, no polyphagia,? no polydipsia  HEME: No bruising, no bleeding disorders, no signs of anemia.?  Physical Exam  Vitals & Measurements  T:?36.5? ?C (Oral)? HR:?84(Peripheral)? BP:?100/70? SpO2:?98%?  HT:?167.00?cm? WT:?72.800?kg?  BMI:?26.1?  General: Well developed, well nourished in no apparent distress, alert and oriented x3  Skin:?No rash or abnormal?lesions  HEENT:?Normocephalic, PERRLA, EOMI, mouth WNL, throat WNL, nares normal, EAC and TM WNL bilaterally  Neck:?FROM, no LAD, no thyroid abnormalities?palpable  Chest:?CTA?bilaterally, no wheezes crackles or rubs  Cardiac: RRR,?no murmurs, rubs, gallops  Abdomen: Soft, nontender,?nondistended, NBSx4, no rebound tenderness or guarding, no HSM  Extremities:?No clubbing, cyanosis, or edema.? Joints WNL, +2 DP/PT pulses bilaterally  Neuro: No sensory or motor defects noted. CN II-XII intact. Gait WNL.  Genital: normal testes, no hernia  Feet: 10G filament x5 bilaterally, no ulcers or sores  Assessment/Plan  1.?Wellness examination?Z00.00  CBC, CMP, Lipids, UA, A1C, microalbumin?ordered today.?  Encourage pt to increase cardiovascular exercise and attempt to obtain at least 150 minutes of moderate aerobic exercise per week or 75 minutes of vigorous aerobic exercise weekly.??  Ordered:  CBC w/ Auto Diff, Routine collect, 09/16/20 9:46:00 CDT, Blood, Order for future visit, Stop date 09/16/20 9:46:00 CDT, Lab Collect, Wellness examination  Diabetes  HTN (hypertension)  Hyperlipidemia, 09/16/20 9:46:00 CDT  Clinic Follow-Up Wellness, *Est. 09/16/21 3:00:00 CDT, Order for future visit, Wellness examination  Diabetes  HTN (hypertension)  Hyperlipidemia  Immunization due, [facility]  Comprehensive Metabolic Panel, Routine collect, 09/16/20 9:46:00 CDT, Blood, Order for future visit, Stop date 09/16/20 9:46:00 CDT, Lab Collect, Wellness examination  Diabetes  HTN (hypertension)  Hyperlipidemia, 09/16/20 9:46:00 CDT  Electrocardiogram, Complete [id number] PC, 09/16/20 9:48:00 CDT, [facility] AMB - AFP, 09/16/20 9:48:00 CDT, Wellness examination  HTN (hypertension)  Hemoglobin A1c, Routine collect, 09/16/20 9:46:00 CDT, Blood, Order for future visit, Stop date 09/16/20 9:46:00 CDT, Lab Collect,  Wellness examination  Diabetes  HTN (hypertension)  Hyperlipidemia, 09/16/20 9:46:00 CDT  Lab Collection Request, 09/16/20 9:46:00 CDT, [facility] AMB - AFP, 09/16/20 9:46:00 CDT, Wellness examination  Diabetes  HTN (hypertension)  Hyperlipidemia  Lipid Panel, Routine collect, 09/16/20 9:46:00 CDT, Blood, Order for future visit, Stop date 09/16/20 9:46:00 CDT, Lab Collect, Wellness examination  Hyperlipidemia  Diabetes  HTN (hypertension), 09/16/20 9:46:00 CDT  Microalbumin Urine, Routine collect, Urine, Order for future visit, 09/16/20 9:46:00 CDT, Stop date 09/16/20 9:46:00 CDT, Nurse collect, Wellness examination  Diabetes  Preventative Health Care Est 40-64 years [id number] PC, Wellness examination  Diabetes  HTN (hypertension)  Hyperlipidemia  Immunization due, [facility] - AFP, 09/16/20 9:48:00 CDT  Urinalysis no Reflex, Routine collect, Urine, Order for future visit, 09/16/20 9:46:00 CDT, Stop date 09/16/20 9:46:00 CDT, Nurse collect, Wellness examination  Diabetes  HTN (hypertension)  ?  2.?Diabetes?E11.9  Stable/well controlled at this time. Continue current treatment, daily CBGs with log, low carb diet/ADA diet recommendations discussed.? Will continue to monitor and FU.?  Eye exam 2/2020.  Foot exam today.  Microalbumin/A1C today--on ARB.  Ordered:  CBC w/ Auto Diff, Routine collect, 09/16/20 9:46:00 CDT, Blood, Order for future visit, Stop date 09/16/20 9:46:00 CDT, Lab Collect, Wellness examination  Diabetes  HTN (hypertension)  Hyperlipidemia, 09/16/20 9:46:00 CDT  Clinic Follow-Up Wellness, *Est. 09/16/21 3:00:00 CDT, Order for future visit, Wellness examination  Diabetes  HTN (hypertension)  Hyperlipidemia  Immunization due, [facility]  Comprehensive Metabolic Panel, Routine collect, 09/16/20 9:46:00 CDT, Blood, Order for future visit, Stop date 09/16/20 9:46:00 CDT, Lab Collect, Wellness examination  Diabetes  HTN (hypertension)  Hyperlipidemia, 09/16/20 9:46:00 CDT  Hemoglobin A1c,  Routine collect, 09/16/20 9:46:00 CDT, Blood, Order for future visit, Stop date 09/16/20 9:46:00 CDT, Lab Collect, Wellness examination  Diabetes  HTN (hypertension)  Hyperlipidemia, 09/16/20 9:46:00 CDT  Lab Collection Request, 09/16/20 9:46:00 CDT, [facility] AMB - AFP, 09/16/20 9:46:00 CDT, Wellness examination  Diabetes  HTN (hypertension)  Hyperlipidemia  Lipid Panel, Routine collect, 09/16/20 9:46:00 CDT, Blood, Order for future visit, Stop date 09/16/20 9:46:00 CDT, Lab Collect, Wellness examination  Hyperlipidemia  Diabetes  HTN (hypertension), 09/16/20 9:46:00 CDT  Microalbumin Urine, Routine collect, Urine, Order for future visit, 09/16/20 9:46:00 CDT, Stop date 09/16/20 9:46:00 CDT, Nurse collect, Wellness examination  Diabetes  Preventative Health Care Est 40-64 years [id number] PC, Wellness examination  Diabetes  HTN (hypertension)  Hyperlipidemia  Immunization due, [facility] - AFP, 09/16/20 9:48:00 CDT  Urinalysis no Reflex, Routine collect, Urine, Order for future visit, 09/16/20 9:46:00 CDT, Stop date 09/16/20 9:46:00 CDT, Nurse collect, Wellness examination  Diabetes  HTN (hypertension)  ?  3.?HTN (hypertension)?I10  Stable and well controlled at this time. Continue current treatment. Limit salt in diet. Monitor BP at home.  EKG today--SR.  Ordered:  CBC w/ Auto Diff, Routine collect, 09/16/20 9:46:00 CDT, Blood, Order for future visit, Stop date 09/16/20 9:46:00 CDT, Lab Collect, Wellness examination  Diabetes  HTN (hypertension)  Hyperlipidemia, 09/16/20 9:46:00 CDT  Clinic Follow-Up Wellness, *Est. 09/16/21 3:00:00 CDT, Order for future visit, Wellness examination  Diabetes  HTN (hypertension)  Hyperlipidemia  Immunization due, [facility]  Comprehensive Metabolic Panel, Routine collect, 09/16/20 9:46:00 CDT, Blood, Order for future visit, Stop date 09/16/20 9:46:00 CDT, Lab Collect, Wellness examination  Diabetes  HTN (hypertension)  Hyperlipidemia, 09/16/20 9:46:00  CDT  Electrocardiogram, Complete [id number] PC, 09/16/20 9:48:00 CDT, [facility] AMB - AFP, 09/16/20 9:48:00 CDT, Wellness examination  HTN (hypertension)  Hemoglobin A1c, Routine collect, 09/16/20 9:46:00 CDT, Blood, Order for future visit, Stop date 09/16/20 9:46:00 CDT, Lab Collect, Wellness examination  Diabetes  HTN (hypertension)  Hyperlipidemia, 09/16/20 9:46:00 CDT  Lab Collection Request, 09/16/20 9:46:00 CDT, [facility] AMB - AFP, 09/16/20 9:46:00 CDT, Wellness examination  Diabetes  HTN (hypertension)  Hyperlipidemia  Lipid Panel, Routine collect, 09/16/20 9:46:00 CDT, Blood, Order for future visit, Stop date 09/16/20 9:46:00 CDT, Lab Collect, Wellness examination  Hyperlipidemia  Diabetes  HTN (hypertension), 09/16/20 9:46:00 CDT  Preventative Health Care Est 40-64 years [id number] PC, Wellness examination  Diabetes  HTN (hypertension)  Hyperlipidemia  Immunization due, [facility] - AFP, 09/16/20 9:48:00 CDT  Urinalysis no Reflex, Routine collect, Urine, Order for future visit, 09/16/20 9:46:00 CDT, Stop date 09/16/20 9:46:00 CDT, Nurse collect, Wellness examination  Diabetes  HTN (hypertension)  ?  4.?Hyperlipidemia?E78.5  Continue current medicine. ?Follow low-cholesterol diet with?physical activity.??  Ordered:  CBC w/ Auto Diff, Routine collect, 09/16/20 9:46:00 CDT, Blood, Order for future visit, Stop date 09/16/20 9:46:00 CDT, Lab Collect, Wellness examination  Diabetes  HTN (hypertension)  Hyperlipidemia, 09/16/20 9:46:00 CDT  Clinic Follow-Up Wellness, *Est. 09/16/21 3:00:00 CDT, Order for future visit, Wellness examination  Diabetes  HTN (hypertension)  Hyperlipidemia  Immunization due, [facility] AFP  Comprehensive Metabolic Panel, Routine collect, 09/16/20 9:46:00 CDT, Blood, Order for future visit, Stop date 09/16/20 9:46:00 CDT, Lab Collect, Wellness examination  Diabetes  HTN (hypertension)  Hyperlipidemia, 09/16/20 9:46:00 CDT  Hemoglobin A1c, Routine collect, 09/16/20 9:46:00 CDT, Blood,  Order for future visit, Stop date 09/16/20 9:46:00 CDT, Lab Collect, Wellness examination  Diabetes  HTN (hypertension)  Hyperlipidemia, 09/16/20 9:46:00 CDT  Lab Collection Request, 09/16/20 9:46:00 CDT, [facility] AMB - AFP, 09/16/20 9:46:00 CDT, Wellness examination  Diabetes  HTN (hypertension)  Hyperlipidemia  Lipid Panel, Routine collect, 09/16/20 9:46:00 CDT, Blood, Order for future visit, Stop date 09/16/20 9:46:00 CDT, Lab Collect, Wellness examination  Hyperlipidemia  Diabetes  HTN (hypertension), 09/16/20 9:46:00 CDT  Preventative Health Care Est 40-64 years [id number] PC, Wellness examination  Diabetes  HTN (hypertension)  Hyperlipidemia  Immunization due, [facility] - AFP, 09/16/20 9:48:00 CDT  ?  5.?Immunization due?Z23  Tdap 0.5ml admin today--risks/benefits discussed with patient.?  Ordered:  tetanus/diphth/pertuss (Tdap) adult/adol, 0.5 mL, IM, Once-Unscheduled, first dose 09/16/20 9:47:00 CDT  Clinic Follow-Up Wellness, *Est. 09/16/21 3:00:00 CDT, Order for future visit, Wellness examination  Diabetes  HTN (hypertension)  Hyperlipidemia  Immunization due, [facility]  Preventative Health Care Est 40-64 years [id number] PC, Wellness examination  Diabetes  HTN (hypertension)  Hyperlipidemia  Immunization due, [facility] AMB - AFP, 09/16/20 9:48:00 CDT  ?  Orders:  glipizide-metformin, See Instructions, TAKE 1 TABLET BY MOUTH TWICE A DAY, # 180 tab(s), 3 Refill(s), Pharmacy: [facility]/pharmacy #[id number], 167, cm, Height/Length Dosing, 09/16/20 9:24:00 CDT, 72.8, kg, Weight Dosing, 09/16/20 9:24:00 CDT  hydrochlorothiazide, See Instructions, TAKE 1 TABLET BY MOUTH EVERY DAY IN THE MORNING, # 90 tab(s), 3 Refill(s), Pharmacy: [facility]/pharmacy #[id number], 167, cm, Height/Length Dosing, 09/16/20 9:24:00 CDT, 72.8, kg, Weight Dosing, 09/16/20 9:24:00 CDT  losartan, See Instructions, TAKE 1 TABLET BY MOUTH DAILY, # 90 tab(s), 3 Refill(s), Pharmacy: [facility]/pharmacy #[id number], 167, cm, Height/Length Dosing, 09/16/20 9:24:00 CDT, 72.8,  kg, Weight Dosing, 09/16/20 9:24:00 CDT  simvastatin, See Instructions, TAKE 1 TABLET BY MOUTH AT BEDTIME WITH EVENING MEAL, # 90 tab(s), 3 Refill(s), Pharmacy: [facility]/pharmacy #[id number], 167, cm, Height/Length Dosing, 09/16/20 9:24:00 CDT, 72.8, kg, Weight Dosing, 09/16/20 9:24:00 CDT  Referrals  Clinic Follow-Up Wellness, *Est. 09/16/21 3:00:00 CDT, Order for future visit, Wellness examination  Diabetes  HTN (hypertension)  Hyperlipidemia  Immunization due, [facility]   Problem List/Past Medical History  Ongoing  Diabetes  HTN (hypertension)  Hyperlipidemia  Historical  No qualifying data  Procedure/Surgical History  Left 3rd digit fx repair (plate/screws) (03.2019)   Medications  glipizide-metformin 2.5 mg-500 mg oral tablet, See Instructions, 3 refills  hydrochlorothiazide 25 mg oral tablet, See Instructions, 3 refills  losartan 100 mg oral tablet, See Instructions, 3 refills  simvastatin 40 mg oral tablet, See Instructions, 3 refills  tetanus/diphth/pertuss (Tdap) adult/adol 5 units-2.5 units-18.5 mcg/0.5 mL intramuscular suspension, 0.5 mL, IM, Once-Unscheduled  valACYclovir 1 g oral tablet, See Instructions, 1 refills  Allergies  No Known Allergies  Social History  Abuse/Neglect  No, 09/30/2019  Alcohol  Current, Beer, 1-2 times per week, 05/26/2018  Employment/School  Employed, Work/School description: SALES., 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 Living situation: Home/Independent., 09/30/2019  Nutrition/Health  Regular, Caffeine intake amount: 2 CUPS OF COFFEE & 1 SODA PER DAY., 08/30/2018  Substance Use  Never, 05/13/2017  Tobacco  Former smoker, quit more than 30 days ago, N/A, 27 Years (Age stopped)., 09/30/2019  Family History  Heart disease.: Mother.  Hyperlipidemia.: Sister.  Primary malignant neoplasm of prostate: Father.  Immunizations  Vaccine Date Status   pneumococcal 23-polyvalent vaccine 09/16/2020 Given   influenza  virus vaccine, inactivated 09/30/2019 Given   influenza virus vaccine, inactivated 08/30/2018 Given   influenza virus vaccine, inactivated 10/13/2017 Recorded   influenza virus vaccine, inactivated 10/18/2016 Recorded   influenza virus vaccine, inactivated 11/11/2015 Recorded   influenza virus vaccine, inactivated 11/12/2014 Recorded   pneumococcal 23-polyvalent vaccine 06/17/2014 Recorded   tetanus/diphtheria/pertussis, acel(Tdap) 12/27/2011 Recorded   influenza virus vaccine, inactivated 10/06/2011 Recorded   Health Maintenance  Health Maintenance  ???Pending?(in the next year)  ??? ??OverDue  ??? ? ? ?Diabetes Maintenance-Microalbumin due??and every?  ??? ? ? ?Alcohol Misuse Screening due??01/02/20??and every 1??year(s)  ??? ??Due?  ??? ? ? ?ADL Screening due??09/16/20??and every 1??year(s)  ??? ? ? ?Depression Screening due??09/16/20??and every?  ??? ? ? ?Influenza Vaccine due??09/16/20??and every?  ??? ??Due In Future?  ??? ? ? ?Diabetes Maintenance-HgbA1c not due until??09/29/20??and every 1??year(s)  ??? ? ? ?Hypertension Management-BMP not due until??09/29/20??and every 1??year(s)  ??? ? ? ?Obesity Screening not due until??01/01/21??and every 1??year(s)  ??? ? ? ?Diabetes Maintenance-Eye Exam not due until??02/06/21??and every 1??year(s)  ???Satisfied?(in the past 1 year)  ??? ??Satisfied?  ??? ? ? ?Blood Pressure Screening on??09/16/20.??Satisfied by Herlinda Kearns LPN  ??? ? ? ?Body Mass Index Check on??09/16/20.??Satisfied by Herlinda Kearns LPN  ??? ? ? ?Diabetes Maintenance-Eye Exam on??02/07/20.??Satisfied by Eve Saucedo  ??? ? ? ?Diabetes Maintenance-Fasting Lipid Profile on??09/30/19.??Satisfied by Jesu Jeffries  ??? ? ? ?Diabetes Maintenance-Foot Exam on??09/16/20.??Satisfied by Gurdeep PRUETT, Mariana Villegas  ??? ? ? ?Diabetes Maintenance-HgbA1c on??09/30/19.??Satisfied by Jesu Jeffries  ??? ? ? ?Diabetes Screening on??09/30/19.??Satisfied by Jesu Jeffries  ??? ? ? ?Hypertension Management-Blood  Pressure on??09/16/20.??Satisfied by Herlinda Kearns LPN  ??? ? ? ?Hypertension Management-BMP on??09/30/19.??Satisfied by Jesu Jeffries  ??? ? ? ?Influenza Vaccine on??09/30/19.??Satisfied by Herlinda Kearns LPN  ??? ? ? ?Lipid Screening on??09/30/19.??Satisfied by Jesu Jeffries  ??? ? ? ?Obesity Screening on??09/16/20.??Satisfied by Herlinda Kearns LPN  ?      Patients condition discussed the development nurse practitioner.?  I have reviewed and agree with plan of care and follow-up.

## 2022-09-14 PROBLEM — E78.00 HYPERCHOLESTEREMIA: Status: ACTIVE | Noted: 2022-09-14

## 2022-09-14 PROBLEM — Z00.00 ENCOUNTER FOR WELLNESS EXAMINATION IN ADULT: Status: ACTIVE | Noted: 2022-09-14

## 2022-09-15 ENCOUNTER — HISTORICAL (OUTPATIENT)
Dept: ADMINISTRATIVE | Facility: HOSPITAL | Age: 47
End: 2022-09-15

## 2022-12-19 PROBLEM — Z00.00 ENCOUNTER FOR WELLNESS EXAMINATION IN ADULT: Status: RESOLVED | Noted: 2022-09-14 | Resolved: 2022-12-19

## 2023-06-20 PROBLEM — H65.02 NON-RECURRENT ACUTE SEROUS OTITIS MEDIA OF LEFT EAR: Status: ACTIVE | Noted: 2023-06-20

## 2023-09-15 PROBLEM — Z12.5 SCREENING PSA (PROSTATE SPECIFIC ANTIGEN): Status: ACTIVE | Noted: 2023-09-15

## 2023-09-15 PROBLEM — Z12.11 COLON CANCER SCREENING: Status: ACTIVE | Noted: 2023-09-15

## 2023-09-15 PROBLEM — Z23 IMMUNIZATION DUE: Status: ACTIVE | Noted: 2023-09-15

## 2023-12-18 PROBLEM — Z00.00 ENCOUNTER FOR WELLNESS EXAMINATION IN ADULT: Status: RESOLVED | Noted: 2022-09-14 | Resolved: 2023-12-18

## 2024-07-13 ENCOUNTER — OFFICE VISIT (OUTPATIENT)
Dept: URGENT CARE | Facility: CLINIC | Age: 49
End: 2024-07-13
Payer: COMMERCIAL

## 2024-07-13 VITALS
HEART RATE: 89 BPM | BODY MASS INDEX: 23.3 KG/M2 | DIASTOLIC BLOOD PRESSURE: 85 MMHG | SYSTOLIC BLOOD PRESSURE: 124 MMHG | RESPIRATION RATE: 18 BRPM | HEIGHT: 66 IN | WEIGHT: 145 LBS | TEMPERATURE: 98 F | OXYGEN SATURATION: 97 %

## 2024-07-13 DIAGNOSIS — H57.89 EYE IRRITATION: Primary | ICD-10-CM

## 2024-07-13 PROCEDURE — 99213 OFFICE O/P EST LOW 20 MIN: CPT | Mod: ,,, | Performed by: NURSE PRACTITIONER

## 2024-07-13 RX ORDER — OFLOXACIN 3 MG/ML
2 SOLUTION/ DROPS OPHTHALMIC 4 TIMES DAILY
Qty: 10 ML | Refills: 0 | Status: SHIPPED | OUTPATIENT
Start: 2024-07-13 | End: 2024-07-18

## 2024-07-13 NOTE — PROGRESS NOTES
"Subjective:      Patient ID: Junito Bryant is a 48 y.o. male.    Vitals:  height is 5' 6" (1.676 m) and weight is 65.8 kg (145 lb). His oral temperature is 98.1 °F (36.7 °C). His blood pressure is 124/85 and his pulse is 89. His respiration is 18 and oxygen saturation is 97%.     Chief Complaint: Eye Problem     Patient is a 48 y.o. male who presents to urgent care with complaints of possible foreign body in left eye x2 days. Alleviating factors include Visine with mild amount of relief. Patient denies trauma.      ROS   Objective:     Physical Exam   Constitutional: He is oriented to person, place, and time. He appears well-developed. He is cooperative.  Non-toxic appearance. He does not appear ill. No distress.   HENT:   Head: Normocephalic and atraumatic.   Ears:   Right Ear: Hearing, tympanic membrane, external ear and ear canal normal.   Left Ear: Hearing, tympanic membrane, external ear and ear canal normal.   Nose: Nose normal. No mucosal edema, rhinorrhea or nasal deformity. No epistaxis. Right sinus exhibits no maxillary sinus tenderness and no frontal sinus tenderness. Left sinus exhibits no maxillary sinus tenderness and no frontal sinus tenderness.   Mouth/Throat: Uvula is midline, oropharynx is clear and moist and mucous membranes are normal. No trismus in the jaw. Normal dentition. No uvula swelling. No oropharyngeal exudate, posterior oropharyngeal edema or posterior oropharyngeal erythema.   Eyes: Conjunctivae and lids are normal. Pupils are equal, round, and reactive to light. No scleral icterus.   Slit lamp exam:       The left eye shows no corneal abrasion, no corneal flare, no corneal ulcer, no foreign body, no hyphema, no hypopyon, no fluorescein uptake and no photophobia.      Comments: I examined with fluorescein and tetracaine  No foreign bodies noted no ulcerations noted no corneal abrasions noted   Neck: Trachea normal and phonation normal. Neck supple. No edema present. No erythema " present. No neck rigidity present.   Cardiovascular: Normal rate, regular rhythm, normal heart sounds and normal pulses.   Pulmonary/Chest: Effort normal and breath sounds normal. No respiratory distress. He has no decreased breath sounds. He has no rhonchi.   Abdominal: Normal appearance.   Musculoskeletal: Normal range of motion.         General: No deformity. Normal range of motion.   Neurological: He is alert and oriented to person, place, and time. He exhibits normal muscle tone. Coordination normal.   Skin: Skin is warm, dry, intact, not diaphoretic and not pale.   Psychiatric: His speech is normal and behavior is normal. Judgment and thought content normal.   Nursing note and vitals reviewed.      Assessment:     1. Eye irritation        Plan:   No definitive abnormalities noted upon exam but we will treat as a corneal abrasion    A corneal abrasion is a scratch or cut to the clear, outer layer of the eyeball.   -Small corneal abrasions are self-limited and typically heal within 24-48 hours.   -Avoid use of contact lenses until your symptoms have resolved.   -Avoid rubbing the affected eye to prevent further damage.      -Anesthetic drops were put in the affected eye to numb the surface of the eyeball to help with the examination and reduce pain. The effects of the numbing should wear off in 10-20 minutes. Do not touch or rub the eye until this has worn off to prevent from further injury.      -Drops of dye were put in the affected eye to help better visualize any damage to the cornea. Your eyeball and tears may appear yellow for a short time after.     -If damage to your eye was detected, you may be prescribed antibiotic drops or ointment as directed by your health care provider.     Eye irritation    Other orders  -     ofloxacin (OCUFLOX) 0.3 % ophthalmic solution; Place 2 drops into the left eye 4 (four) times daily. for 5 days  Dispense: 10 mL; Refill: 0

## 2024-09-03 DIAGNOSIS — Z00.00 WELLNESS EXAMINATION: Primary | ICD-10-CM

## 2024-09-16 NOTE — PROGRESS NOTES
..Annual Exam       HPI:     Patient presents for wellness examination.    He has been feeling well.    He has been sleeping well.    His appetite is good; doing well with diet.   He is  with a 10-year-old son.  He is in  at [employer]  He does not smoke.    He drinks a 6 pack on weekends.    He has 2 cups of coffee a day.  He may have 1 soft drink a day.  He exercises twice a week.  He has not been screened for colon cancer.    The patient's Health Maintenance was reviewed and the following appears to be due at this time:   Health Maintenance Due   Topic Date Due    Hepatitis C Screening  Never done    HIV Screening  Never done    Eye Exam  09/15/2023    Hemoglobin A1c  03/15/2024    COVID-19 Vaccine (4 - 2023-24 season) 09/01/2024    Diabetes Urine Screening  09/15/2024    Lipid Panel  09/15/2024       ..History reviewed. No pertinent past medical history.       ..  Past Surgical History:   Procedure Laterality Date    Left 3rd digit fx repair (plate/screws  03/2019          Current Outpatient Medications   Medication Instructions    glipizide-metformin (METAGLIP) 2.5-500 mg per tablet 1 tablet, Oral, 2 times daily with meals    hydroCHLOROthiazide (HYDRODIURIL) 25 mg, Oral, Daily    losartan (COZAAR) 100 mg, Oral, Daily    RYBELSUS 7 mg tablet Take 1 tablet by mouth daily on an empty stomach with no more than 4 oz of plain water. Swallow whole. Do not split, crush, or chew. Wait at least 30 minutes to eat, drink, or take other oral medications.    simvastatin (ZOCOR) 40 MG tablet TAKE 1 TABLET BY MOUTH  WITH EVENING MEAL Strength: 40 mg    valACYclovir (VALTREX) 1000 MG tablet Take 2 tablets by mouth on onset an repeat in 12 hours.         ..  Social History     Socioeconomic History    Marital status:     Number of children: 1   Occupational History    Occupation: sales   Tobacco Use    Smoking status: Former     Current packs/day: 0.00     Types: Cigarettes     Quit date: 2008      Years since quittin.7    Smokeless tobacco: Never   Substance and Sexual Activity    Alcohol use: Yes     Comment: 1-2 times a week    Drug use: Never     Social Determinants of Health     Financial Resource Strain: Low Risk  (2024)    Overall Financial Resource Strain (CARDIA)     Difficulty of Paying Living Expenses: Not hard at all   Food Insecurity: No Food Insecurity (2024)    Hunger Vital Sign     Worried About Running Out of Food in the Last Year: Never true     Ran Out of Food in the Last Year: Never true   Physical Activity: Sufficiently Active (2024)    Exercise Vital Sign     Days of Exercise per Week: 5 days     Minutes of Exercise per Session: 90 min   Stress: No Stress Concern Present (2024)    [employer] - Occupational Stress Questionnaire     Feeling of Stress : Only a little   Housing Stability: Unknown (2024)    Housing Stability Vital Sign     Unable to Pay for Housing in the Last Year: No          ..  Family History   Problem Relation Name Age of Onset    Heart disease Mother      Prostate cancer Father      Hyperlipidemia Sister            ..Review of patient's allergies indicates:  No Known Allergies       ..  Immunization History   Administered Date(s) Administered    COVID-19, MRNA, LN-S, PF (Pfizer) (Purple Cap) 2021, 2021, 2021    Influenza - Quadrivalent 2022    Influenza - Trivalent - Fluarix, Flulaval, Fluzone, Afluria - PF 10/06/2011, 2014, 2015, 10/18/2016, 10/13/2017, 2018, 2019, 10/28/2020, 2021, 2024    Pneumococcal Conjugate - 20 Valent 09/15/2023    Pneumococcal Polysaccharide - 23 Valent 2014, 2020    Tdap 2011, 2020          REVIEW OF SYSTEMS:    GENERAL: No weight loss, no weight gain, no fever, no fatigue, no chills, no night sweats  HEENT: No sore throat, no ear pain, no sinus pressure, no nasal congestion, no rhinorrhea, no decreased  "hearing, no snoring  VISION: No vision changes, no blurry vision, no double vision, no glaucoma, no cataracts, + glasses  LAST EYE EXAM:  [facility], appointment pending  NECK: no LAD  CARDIAC: No chest pain, no palpitations, no dyspnea on exertion, no orthopnea  RESPIRATORY: No cough, no wheezing, no sputum production, no SOB  GI: No abdominal pain, no N/V, no heartburn, no constipation, no diarrhea, no blood in stool, (- ) family history of Colon Ca  : No dysuria, no hematuria, no frequency, no urgency, no incontinence, no testicular pain/swelling, (+ ) family history of Prostate Ca: father  MUSC/SKEL: No myalgia, no weakness, no edema, no arthralgia, no joint swelling/effusions  SKIN: No rashes, no hives, no itching, no sores  NEURO: No HA, no numbness, no tingling, no weakness, no dizziness  PSYCH: No anxiety, no depression, no irritability, no panic attacks, no s/i, no h/i, no hallucinations  ENDO: No polyuria, no polyphagia, no polydipsia  HEME: No bruising, no bleeding disorders, no signs of anemia.       PHYSICAL EXAM:    ..Visit Vitals  /72 (BP Location: Right arm, Patient Position: Sitting, BP Method: Large (Manual))   Pulse 83   Temp 98.8 °F (37.1 °C)   Resp 18   Ht 5' 6" (1.676 m)   Wt 67.1 kg (148 lb)   SpO2 97%   BMI 23.89 kg/m²        General: Well developed, well nourished white male in no apparent distress, alert and oriented x3  Skin: No rash or abnormal lesions  HEENT: Normocephalic, PERRLA, EOMI, mouth WNL, throat WNL, nares normal, EAC and TM WNL bilaterally  Neck: FROM, no LAD, no thyroid abnormalities palpable  Chest: CTA bilaterally, no wheezes crackles or rubs  Cardiac: RRR, no murmurs, rubs, gallops  Abdomen: Soft, nontender, nondistended, NBSx4, no rebound tenderness or guarding, no HSM  Extremities: No clubbing, cyanosis, or edema. Joints WNL, +2 DP/PT pulses bilaterally  Neuro: No sensory or motor defects noted. CN II-XII intact. Gait WNL.  Genital: normal testes, no " hernias  Rectal:  Deferred  Protective Sensation (w/ 10 gram monofilament):  Right: Intact  Left: Intact    Visual Inspection:  Normal -  Bilateral    Pedal Pulses:   Right: Present  Left: Present    Posterior Tibialis Pulses:   Right:Present  Left: Present       1. Encounter for wellness examination in adult  Overview:  Patient presents for wellness examination.    He has been feeling well.    Patient encouraged to increase physical activity and exercise.  Colon cancer screening options discussed with patient; patient would like to do a Cologuard.    Prevnar 20 administered.    Last eye exam:  09/2022; he will be doing an eye exam soon.  Labs pending.    09/20/2024:   Patient presents for wellness examination.    He has been feeling well.    Continue ADA diet; limit intake of sugary foods refined carbohydrates.    Patient encouraged to increase physical activity and to exercise regularly.    Patient advised to have eyes examined.  Foot exam performed today.    His blood pressure is well controlled.    Influenza vaccine administered today.    Labs pending.      2. Type 2 diabetes mellitus without complication, without long-term current use of insulin  Overview:  A1C 9.0%.  On glipizide/metformin 2.5/500 mg twice daily. Daily CBGs with log, low carb diet/ADA diet recommendations discussed. Will continue to monitor and FU.   Eye exam 9/2022.  Foot exam 9/2022.  Microalbumin 9/2022; on ARB.     09/15/2023:  Compliance with ADA diet encouraged; limit intake of sugary foods refined carbohydrates.  Increase physical activity and exercise.    Foot exam performed today.    Last eye exam 09/2022; has one coming up.  A1c, microalbumin and lipid profile pending.    09/20/2024: Compliance with ADA diet encouraged; limit intake of sugary foods refined carbohydrates.  Increase physical activity and exercise.    Patient will have appointment soon at [facility].    Foot exam performed today.    A1c and microalbumin level  pending.      Orders:  -     glipizide-metformin (METAGLIP) 2.5-500 mg per tablet; Take 1 tablet by mouth 2 (two) times daily with meals.  Dispense: 180 tablet; Refill: 3    3. Hypercholesteremia  Overview:  Continue low-cholesterol/low-fat diet.    Patient has been compliant with medications; refills sent.    Lipid profile pending.    09/20/2024:  Continue low-cholesterol/low-fat diet.    Patient has been compliant with medications; refills sent.    Lipid profile pending.    Orders:  -     simvastatin (ZOCOR) 40 MG tablet; TAKE 1 TABLET BY MOUTH  WITH EVENING MEAL Strength: 40 mg  Dispense: 90 tablet; Refill: 3    4. Primary hypertension  Overview:  Controlled; continue current medications.  Refills sent.    09/20/2024:  His blood pressure is very well controlled; continue losartan hydrochlorothiazide; refills sent.      Orders:  -     hydroCHLOROthiazide (HYDRODIURIL) 25 MG tablet; Take 1 tablet (25 mg total) by mouth once daily.  Dispense: 90 tablet; Refill: 3  -     losartan (COZAAR) 100 MG tablet; Take 1 tablet (100 mg total) by mouth once daily.  Dispense: 90 tablet; Refill: 3    5. Screening PSA (prostate specific antigen)  Overview:  Patient denies any obstructive or irritative voiding symptoms.    His father had prostate cancer.    Prostate exam deferred.    PSA pending.    09/20/2024:  Patient denies any obstructive or irritative voiding symptoms.    His father had prostate cancer.  PSA pending.      6. Immunization due  Overview:  Prevnar 20 administered; benefits/potential risks/side effects discussed with patient.    09/20/2024:  Influenza 0.5 IM administered; benefits, risks and side effects discussed with patient.    Orders:  -     ([facility]) influenza (Flulaval, Fluzone, Fluarix) 45 mcg/0.5 mL IM vaccine (> or = 6 mo) 0.5 mL    7. Colon cancer screening  Overview:  Colon cancer screening options discussed with patient; benefits and risks of each reviewed.    Patient denies any abdominal pain, diarrhea,  constipation, melena, hematochezia or unintentional weight loss.    Patient denies any family history of colon cancer.    Patient would like to do a Cologuard test; patient advised should Cologuard test return positive that he will need to do a diagnostic colonoscopy.  Patient expressed understanding.    09/20/2024:  Colon cancer screening options discussed with patient; benefits and risks of each reviewed.    Patient denies any abdominal pain, diarrhea, constipation, melena, hematochezia or unintentional weight loss.    Patient denies any family history of colon cancer.    Patient would like to do a Cologuard test; patient advised should Cologuard test return positive that he will need to do a diagnostic colonoscopy.  Patient expressed understanding.    Orders:  -     Cologuard Screening (Multitarget Stool DNA); Future; Expected date: 09/20/2024         ..Follow up in about 1 year (around 9/20/2025) for Wellness.     Future Appointments   Date Time Provider Department Center   9/22/2025  8:00 AM Ming Robels MD [facility]

## 2024-09-20 ENCOUNTER — OFFICE VISIT (OUTPATIENT)
Dept: PRIMARY CARE CLINIC | Facility: CLINIC | Age: 49
End: 2024-09-20
Payer: COMMERCIAL

## 2024-09-20 ENCOUNTER — LAB VISIT (OUTPATIENT)
Dept: LAB | Facility: HOSPITAL | Age: 49
End: 2024-09-20
Attending: FAMILY MEDICINE
Payer: COMMERCIAL

## 2024-09-20 VITALS
TEMPERATURE: 99 F | BODY MASS INDEX: 23.78 KG/M2 | WEIGHT: 148 LBS | RESPIRATION RATE: 18 BRPM | HEART RATE: 83 BPM | SYSTOLIC BLOOD PRESSURE: 103 MMHG | OXYGEN SATURATION: 97 % | HEIGHT: 66 IN | DIASTOLIC BLOOD PRESSURE: 72 MMHG

## 2024-09-20 DIAGNOSIS — Z00.00 ENCOUNTER FOR WELLNESS EXAMINATION IN ADULT: Primary | ICD-10-CM

## 2024-09-20 DIAGNOSIS — I10 PRIMARY HYPERTENSION: ICD-10-CM

## 2024-09-20 DIAGNOSIS — E78.00 HYPERCHOLESTEREMIA: ICD-10-CM

## 2024-09-20 DIAGNOSIS — Z12.5 SCREENING PSA (PROSTATE SPECIFIC ANTIGEN): ICD-10-CM

## 2024-09-20 DIAGNOSIS — Z23 IMMUNIZATION DUE: ICD-10-CM

## 2024-09-20 DIAGNOSIS — E11.9 TYPE 2 DIABETES MELLITUS WITHOUT COMPLICATION, WITHOUT LONG-TERM CURRENT USE OF INSULIN: ICD-10-CM

## 2024-09-20 DIAGNOSIS — Z12.11 COLON CANCER SCREENING: ICD-10-CM

## 2024-09-20 DIAGNOSIS — Z00.00 WELLNESS EXAMINATION: ICD-10-CM

## 2024-09-20 LAB
ALBUMIN SERPL-MCNC: 4.3 G/DL (ref 3.5–5)
ALBUMIN/GLOB SERPL: 1.7 RATIO (ref 1.1–2)
ALP SERPL-CCNC: 63 UNIT/L (ref 40–150)
ALT SERPL-CCNC: 23 UNIT/L (ref 0–55)
ANION GAP SERPL CALC-SCNC: 10 MEQ/L
AST SERPL-CCNC: 19 UNIT/L (ref 5–34)
BACTERIA #/AREA URNS AUTO: ABNORMAL /HPF
BASOPHILS # BLD AUTO: 0.05 X10(3)/MCL
BASOPHILS NFR BLD AUTO: 0.9 %
BILIRUB SERPL-MCNC: 1 MG/DL
BILIRUB UR QL STRIP.AUTO: NEGATIVE
BUN SERPL-MCNC: 13 MG/DL (ref 8.9–20.6)
CALCIUM SERPL-MCNC: 9.9 MG/DL (ref 8.4–10.2)
CHLORIDE SERPL-SCNC: 103 MMOL/L (ref 98–107)
CHOLEST SERPL-MCNC: 142 MG/DL
CHOLEST/HDLC SERPL: 3 {RATIO} (ref 0–5)
CLARITY UR: CLEAR
CO2 SERPL-SCNC: 27 MMOL/L (ref 22–29)
COLOR UR AUTO: ABNORMAL
CREAT SERPL-MCNC: 0.89 MG/DL (ref 0.73–1.18)
CREAT UR-MCNC: 90 MG/DL (ref 63–166)
CREAT/UREA NIT SERPL: 15
EOSINOPHIL # BLD AUTO: 0.08 X10(3)/MCL (ref 0–0.9)
EOSINOPHIL NFR BLD AUTO: 1.4 %
ERYTHROCYTE [DISTWIDTH] IN BLOOD BY AUTOMATED COUNT: 12.6 % (ref 11.5–17)
EST. AVERAGE GLUCOSE BLD GHB EST-MCNC: 108.3 MG/DL
GFR SERPLBLD CREATININE-BSD FMLA CKD-EPI: >60 ML/MIN/1.73/M2
GLOBULIN SER-MCNC: 2.5 GM/DL (ref 2.4–3.5)
GLUCOSE SERPL-MCNC: 68 MG/DL (ref 74–100)
GLUCOSE UR QL STRIP: NORMAL
HBA1C MFR BLD: 5.4 %
HCT VFR BLD AUTO: 47 % (ref 42–52)
HDLC SERPL-MCNC: 51 MG/DL (ref 35–60)
HGB BLD-MCNC: 15.9 G/DL (ref 14–18)
HGB UR QL STRIP: NEGATIVE
IMM GRANULOCYTES # BLD AUTO: 0.02 X10(3)/MCL (ref 0–0.04)
IMM GRANULOCYTES NFR BLD AUTO: 0.4 %
KETONES UR QL STRIP: NEGATIVE
LDLC SERPL CALC-MCNC: 81 MG/DL (ref 50–140)
LEUKOCYTE ESTERASE UR QL STRIP: NEGATIVE
LYMPHOCYTES # BLD AUTO: 1.4 X10(3)/MCL (ref 0.6–4.6)
LYMPHOCYTES NFR BLD AUTO: 25.1 %
MCH RBC QN AUTO: 29.3 PG (ref 27–31)
MCHC RBC AUTO-ENTMCNC: 33.8 G/DL (ref 33–36)
MCV RBC AUTO: 86.6 FL (ref 80–94)
MICROALBUMIN UR-MCNC: <5 UG/ML
MICROALBUMIN/CREAT RATIO PNL UR: NORMAL
MONOCYTES # BLD AUTO: 0.46 X10(3)/MCL (ref 0.1–1.3)
MONOCYTES NFR BLD AUTO: 8.3 %
MUCOUS THREADS URNS QL MICRO: ABNORMAL /LPF
NEUTROPHILS # BLD AUTO: 3.56 X10(3)/MCL (ref 2.1–9.2)
NEUTROPHILS NFR BLD AUTO: 63.9 %
NITRITE UR QL STRIP: NEGATIVE
NRBC BLD AUTO-RTO: 0 %
PH UR STRIP: 7.5 [PH]
PLATELET # BLD AUTO: 327 X10(3)/MCL (ref 130–400)
PMV BLD AUTO: 10.3 FL (ref 7.4–10.4)
POTASSIUM SERPL-SCNC: 4.3 MMOL/L (ref 3.5–5.1)
PROT SERPL-MCNC: 6.8 GM/DL (ref 6.4–8.3)
PROT UR QL STRIP: NEGATIVE
RBC # BLD AUTO: 5.43 X10(6)/MCL (ref 4.7–6.1)
RBC #/AREA URNS AUTO: ABNORMAL /HPF
SODIUM SERPL-SCNC: 140 MMOL/L (ref 136–145)
SP GR UR STRIP.AUTO: 1.01 (ref 1–1.03)
SQUAMOUS #/AREA URNS LPF: ABNORMAL /HPF
TRIGL SERPL-MCNC: 51 MG/DL (ref 34–140)
TSH SERPL-ACNC: 0.86 UIU/ML (ref 0.35–4.94)
UROBILINOGEN UR STRIP-ACNC: NORMAL
VLDLC SERPL CALC-MCNC: 10 MG/DL
WBC # BLD AUTO: 5.57 X10(3)/MCL (ref 4.5–11.5)
WBC #/AREA URNS AUTO: ABNORMAL /HPF

## 2024-09-20 PROCEDURE — 81001 URINALYSIS AUTO W/SCOPE: CPT

## 2024-09-20 PROCEDURE — 82570 ASSAY OF URINE CREATININE: CPT

## 2024-09-20 PROCEDURE — 81015 MICROSCOPIC EXAM OF URINE: CPT

## 2024-09-20 PROCEDURE — 80053 COMPREHEN METABOLIC PANEL: CPT

## 2024-09-20 PROCEDURE — 36415 COLL VENOUS BLD VENIPUNCTURE: CPT

## 2024-09-20 PROCEDURE — 80061 LIPID PANEL: CPT

## 2024-09-20 PROCEDURE — 85025 COMPLETE CBC W/AUTO DIFF WBC: CPT

## 2024-09-20 PROCEDURE — 83036 HEMOGLOBIN GLYCOSYLATED A1C: CPT

## 2024-09-20 PROCEDURE — 84443 ASSAY THYROID STIM HORMONE: CPT

## 2024-09-20 RX ORDER — LOSARTAN POTASSIUM 100 MG/1
100 TABLET ORAL DAILY
Qty: 90 TABLET | Refills: 3 | Status: SHIPPED | OUTPATIENT
Start: 2024-09-20 | End: 2025-09-15

## 2024-09-20 RX ORDER — HYDROCHLOROTHIAZIDE 25 MG/1
25 TABLET ORAL DAILY
Qty: 90 TABLET | Refills: 3 | Status: SHIPPED | OUTPATIENT
Start: 2024-09-20 | End: 2025-09-15

## 2024-09-20 RX ORDER — SIMVASTATIN 40 MG/1
TABLET, FILM COATED ORAL
Qty: 90 TABLET | Refills: 3 | Status: SHIPPED | OUTPATIENT
Start: 2024-09-20

## 2024-09-20 RX ORDER — GLIPIZIDE AND METFORMIN HCL 2.5; 5 MG/1; MG/1
1 TABLET, FILM COATED ORAL 2 TIMES DAILY WITH MEALS
Qty: 180 TABLET | Refills: 3 | Status: SHIPPED | OUTPATIENT
Start: 2024-09-20 | End: 2025-09-15

## 2024-09-23 ENCOUNTER — TELEPHONE (OUTPATIENT)
Dept: PRIMARY CARE CLINIC | Facility: CLINIC | Age: 49
End: 2024-09-23

## 2025-08-11 ENCOUNTER — TELEPHONE (OUTPATIENT)
Dept: PRIMARY CARE CLINIC | Facility: CLINIC | Age: 50
End: 2025-08-11
Payer: COMMERCIAL

## 2025-08-21 ENCOUNTER — OFFICE VISIT (OUTPATIENT)
Dept: PRIMARY CARE CLINIC | Facility: CLINIC | Age: 50
End: 2025-08-21
Payer: COMMERCIAL

## 2025-08-21 VITALS
WEIGHT: 153.63 LBS | BODY MASS INDEX: 24.69 KG/M2 | TEMPERATURE: 98 F | HEIGHT: 66 IN | HEART RATE: 71 BPM | SYSTOLIC BLOOD PRESSURE: 118 MMHG | DIASTOLIC BLOOD PRESSURE: 76 MMHG | OXYGEN SATURATION: 96 %

## 2025-08-21 DIAGNOSIS — H65.01 NON-RECURRENT ACUTE SEROUS OTITIS MEDIA OF RIGHT EAR: Primary | ICD-10-CM

## 2025-08-21 DIAGNOSIS — E11.9 TYPE 2 DIABETES MELLITUS WITHOUT COMPLICATION, WITHOUT LONG-TERM CURRENT USE OF INSULIN: ICD-10-CM

## 2025-08-21 DIAGNOSIS — H60.332 ACUTE SWIMMER'S EAR OF LEFT SIDE: ICD-10-CM

## 2025-08-21 RX ORDER — OFLOXACIN 3 MG/ML
5 SOLUTION AURICULAR (OTIC) 2 TIMES DAILY
Qty: 5 ML | Refills: 0 | Status: SHIPPED | OUTPATIENT
Start: 2025-08-21 | End: 2025-08-28

## 2025-08-31 DIAGNOSIS — I10 PRIMARY HYPERTENSION: ICD-10-CM

## 2025-08-31 DIAGNOSIS — E78.00 HYPERCHOLESTEREMIA: ICD-10-CM

## 2025-08-31 DIAGNOSIS — E11.9 TYPE 2 DIABETES MELLITUS WITHOUT COMPLICATION, WITHOUT LONG-TERM CURRENT USE OF INSULIN: ICD-10-CM

## 2025-09-02 RX ORDER — SIMVASTATIN 40 MG/1
40 TABLET, FILM COATED ORAL
Qty: 90 TABLET | Refills: 2 | Status: SHIPPED | OUTPATIENT
Start: 2025-09-02

## 2025-09-02 RX ORDER — LOSARTAN POTASSIUM 100 MG/1
100 TABLET ORAL DAILY
Qty: 90 TABLET | Refills: 2 | Status: SHIPPED | OUTPATIENT
Start: 2025-09-02

## 2025-09-02 RX ORDER — GLIPIZIDE AND METFORMIN HCL 2.5; 5 MG/1; MG/1
1 TABLET, FILM COATED ORAL 2 TIMES DAILY WITH MEALS
Qty: 180 TABLET | Refills: 2 | Status: SHIPPED | OUTPATIENT
Start: 2025-09-02